# Patient Record
Sex: FEMALE | Race: WHITE | NOT HISPANIC OR LATINO | Employment: STUDENT | URBAN - METROPOLITAN AREA
[De-identification: names, ages, dates, MRNs, and addresses within clinical notes are randomized per-mention and may not be internally consistent; named-entity substitution may affect disease eponyms.]

---

## 2017-02-05 ENCOUNTER — TRANSFERRED RECORDS (OUTPATIENT)
Dept: HEALTH INFORMATION MANAGEMENT | Facility: CLINIC | Age: 27
End: 2017-02-05

## 2017-03-17 ENCOUNTER — PRENATAL OFFICE VISIT (OUTPATIENT)
Dept: NURSING | Facility: CLINIC | Age: 27
End: 2017-03-17
Payer: COMMERCIAL

## 2017-03-17 VITALS
RESPIRATION RATE: 16 BRPM | BODY MASS INDEX: 28.34 KG/M2 | DIASTOLIC BLOOD PRESSURE: 56 MMHG | SYSTOLIC BLOOD PRESSURE: 112 MMHG | WEIGHT: 166 LBS | HEIGHT: 64 IN | TEMPERATURE: 98.2 F | HEART RATE: 72 BPM

## 2017-03-17 DIAGNOSIS — O23.41 INFECTION OF URINARY TRACT IN PREGNANCY IN FIRST TRIMESTER: ICD-10-CM

## 2017-03-17 DIAGNOSIS — N91.2 ABSENCE OF MENSTRUATION: Primary | ICD-10-CM

## 2017-03-17 DIAGNOSIS — O09.90 HIGH-RISK PREGNANCY: ICD-10-CM

## 2017-03-17 LAB
ALBUMIN UR-MCNC: NEGATIVE MG/DL
APPEARANCE UR: ABNORMAL
BETA HCG QUAL IFA URINE: POSITIVE
BILIRUB UR QL STRIP: NEGATIVE
COLOR UR AUTO: YELLOW
ERYTHROCYTE [DISTWIDTH] IN BLOOD BY AUTOMATED COUNT: 15.7 % (ref 10–15)
GLUCOSE UR STRIP-MCNC: NEGATIVE MG/DL
HCT VFR BLD AUTO: 32 % (ref 35–47)
HGB BLD-MCNC: 10.2 G/DL (ref 11.7–15.7)
HGB UR QL STRIP: NEGATIVE
KETONES UR STRIP-MCNC: NEGATIVE MG/DL
LEUKOCYTE ESTERASE UR QL STRIP: NEGATIVE
MCH RBC QN AUTO: 23.8 PG (ref 26.5–33)
MCHC RBC AUTO-ENTMCNC: 31.9 G/DL (ref 31.5–36.5)
MCV RBC AUTO: 75 FL (ref 78–100)
NITRATE UR QL: POSITIVE
PH UR STRIP: 6 PH (ref 5–7)
PLATELET # BLD AUTO: 260 10E9/L (ref 150–450)
RBC # BLD AUTO: 4.29 10E12/L (ref 3.8–5.2)
SP GR UR STRIP: >1.03 (ref 1–1.03)
URN SPEC COLLECT METH UR: ABNORMAL
UROBILINOGEN UR STRIP-ACNC: 0.2 EU/DL (ref 0.2–1)
WBC # BLD AUTO: 6.2 10E9/L (ref 4–11)

## 2017-03-17 PROCEDURE — 86901 BLOOD TYPING SEROLOGIC RH(D): CPT | Performed by: FAMILY MEDICINE

## 2017-03-17 PROCEDURE — 87186 SC STD MICRODIL/AGAR DIL: CPT | Performed by: FAMILY MEDICINE

## 2017-03-17 PROCEDURE — 84703 CHORIONIC GONADOTROPIN ASSAY: CPT | Performed by: FAMILY MEDICINE

## 2017-03-17 PROCEDURE — 86850 RBC ANTIBODY SCREEN: CPT | Performed by: FAMILY MEDICINE

## 2017-03-17 PROCEDURE — 86900 BLOOD TYPING SEROLOGIC ABO: CPT | Performed by: FAMILY MEDICINE

## 2017-03-17 PROCEDURE — 87340 HEPATITIS B SURFACE AG IA: CPT | Performed by: FAMILY MEDICINE

## 2017-03-17 PROCEDURE — 86762 RUBELLA ANTIBODY: CPT | Performed by: FAMILY MEDICINE

## 2017-03-17 PROCEDURE — 81003 URINALYSIS AUTO W/O SCOPE: CPT | Performed by: FAMILY MEDICINE

## 2017-03-17 PROCEDURE — 99207 ZZC NO CHARGE NURSE ONLY: CPT

## 2017-03-17 PROCEDURE — 87086 URINE CULTURE/COLONY COUNT: CPT | Performed by: FAMILY MEDICINE

## 2017-03-17 PROCEDURE — 36415 COLL VENOUS BLD VENIPUNCTURE: CPT | Performed by: FAMILY MEDICINE

## 2017-03-17 PROCEDURE — 87088 URINE BACTERIA CULTURE: CPT | Performed by: FAMILY MEDICINE

## 2017-03-17 PROCEDURE — 85027 COMPLETE CBC AUTOMATED: CPT | Performed by: FAMILY MEDICINE

## 2017-03-17 PROCEDURE — 86780 TREPONEMA PALLIDUM: CPT | Performed by: FAMILY MEDICINE

## 2017-03-17 PROCEDURE — 87389 HIV-1 AG W/HIV-1&-2 AB AG IA: CPT | Performed by: FAMILY MEDICINE

## 2017-03-17 NOTE — MR AVS SNAPSHOT
"              After Visit Summary   3/17/2017    Nickie Sherman    MRN: [id number]           Patient Information     Date Of Birth          1990        Visit Information        Provider Department      3/17/2017 2:30 PM CP RN [facility]        Today's Diagnoses     Absence of menstruation    -  1    High-risk pregnancy          Care Instructions    Call the [facility] [place] to schedule early US to be done in about a week or more: [phone].        Follow-ups after your visit        Your next 10 appointments already scheduled     Apr 26, 2017  3:30 PM CDT   New Prenatal with Coby Chavez MD   [facility]    [address] [place] [id number] [phone]              Who to contact     If you have questions or need follow up information about today's clinic visit or your schedule please contact [facility] directly at [phone].  Normal or non-critical lab and imaging results will be communicated to you by iSiteshart, letter or phone within 4 business days after the clinic has received the results. If you do not hear from us within 7 days, please contact the clinic through iSiteshart or phone. If you have a critical or abnormal lab result, we will notify you by phone as soon as possible.  Submit refill requests through Cinexio or call your pharmacy and they will forward the refill request to us. Please allow 3 business days for your refill to be completed.          Additional Information About Your Visit        iSiteshart Information     Cinexio lets you send messages to your doctor, view your test results, renew your prescriptions, schedule appointments and more. To sign up, go to [email or web address] . Click on \"Log in\" on the left side of the screen, which will take you to the Welcome page. Then click on \"Sign up Now\" on the right side of the page.     You will be asked to " "enter the access code listed below, as well as some personal information. Please follow the directions to create your username and password.     Your access code is: [email or web address]  Expires: 6/15/2017  3:30 PM     Your access code will  in 90 days. If you need help or a new code, please call your [facility] or [phone].        Care EveryWhere ID     This is your Care EveryWhere ID. This could be used by other organizations to access your [place] medical records  [id number]        Your Vitals Were     Pulse Temperature Respirations Height Last Period Breastfeeding?    72 98.2  F (36.8  C) (Oral) 16 5' 4\" (1.626 m) 2017 (Approximate) Unknown    BMI (Body Mass Index)                   28.49 kg/m2            Blood Pressure from Last 3 Encounters:   17 112/56   16 92/57   11/27/15 96/63    Weight from Last 3 Encounters:   17 166 lb (75.3 kg)   16 162 lb (73.5 kg)   11/27/15 167 lb (75.8 kg)              We Performed the Following     ABO/RH TYPE AND SCREEN     Anti Treponema     Beta HCG qual IFA urine     CBC WITH PLATELETS     Hepatitis B surface antigen     HIV Antigen Antibody Combo     Rubella Antibody IgG Quantitative     Urine Culture Aerobic Bacterial     URINE MACROSCOPIC ONLY        Primary Care Provider Office Phone # Fax #    Ben Aponte [phone]       [facility] [address] [facility] [address]        Thank you!     Thank you for choosing [facility]  for your care. Our goal is always to provide you with excellent care. Hearing back from our patients is one way we can continue to improve our services. Please take a few minutes to complete the written survey that you may receive in the mail after your visit with us. Thank you!             Your Updated Medication List - Protect others around you: Learn how to safely use, store and throw away your medicines at www.disposemymeds.org.    "   Notice  As of 3/17/2017  3:41 PM    You have not been prescribed any medications.

## 2017-03-17 NOTE — PROGRESS NOTES
"  Estimated Date of Delivery: Nov 12, 2017    She has had bleeding since her LMP.  The bleeding  was pinkish, in small, on several occasions, and was not accompanied by cramping, she just noticed pink on toilet paper when wiping.   She has had mild nausea.. Weigh loss has not occurred.   This was not a planned pregnancy.   OTHER CONCERNS: none  STI HISTORY : none    Pre Term Labor Risk Assessment   1. Is the patient's age <18 or >40? No  2. Does the patient have a BMI < 18.5? No  3. If previous pregnancy, was delivery within previous 6 months? No  4. Have you ever been diagnosed with pyelonephritis? Yes: 2014 in , not admitted, just treated in ER  5. Have you ever delivered a baby prior to 37 weeks gestation? No  6. Have you ever been told you have a uterine anomaly? No  7. Do you currently have uterine fibroids? No  8. Have you had any gynecological surgical procedures such as cervical conization, a LEEP procedure, laser treatment or cryosurgery of the cervix? No  9. Did your mother take KAROL or any other hormones when she was pregnant with you? No  10. Did conception for this pregnancy occur via In Vitro Fertilization? No  11. Are you carrying twins? No  12. Do you currently use tobacco products? No, quit 8 months ago  13. Prior to this pregnancy, how much alcohol did you drink each week? (if >7/week= high risk..) \"little to none\"  14. Since you learned you were pregnant, how much beer, wine or hard liquor did you drink per week? (anything >0 = high risk) 0  15. Prior to learning you are pregnant, did you use any of the following: marijuana, prescription narcotics, speed, cocaine, heroin, hallucinogens or other drugs? (any use within 1 yr = high risk) No  16. Since you learned you are pregnant, have you used any of the following: marijuana, prescription narcotics, speed, cocaine, heroin, hallucinogens or other drugs? (any use = high risk) No  17. Do you have a history of chemical dependency (yes=high risk) " No  18. Have you ever been treated for more than 1 Urinary Tract Infection during this pregnancy? No  19. Do you have a history of Depression, Bi-polar disorder, anxiety, schizophrenia or other mental illness? Yes: anxiety, was on meds in , no meds, just counseling since   20. Are you currently being treated for depression, bi-polar disorder, anxiety, schizophrenia or other mental illness? No  21. Have you had Chlamydia or gonorrhea during this pregnancy? No  22. Periodontal disease (gum disease)? No  23. Has anyone hit, slapped, kicked or otherwise hurt you? No  24. Has anyone forced you to have sex when you didn't want to? No    Summary:  Patient is high risk for  Labor (verify Problem List includes V23.9 and note risk factor(s) in the Comments)  The patient has the following risk factors for pre term labor:  Q19: History of Depression, bi-polar, anxiety, schizophrenia       Prenatal OB Questionnaire  Past Medical History  Hypertension  No  Heart Disease, mitral valve prolapse, or rheumatic fever?  No  An autoimmune disorder such as Lupus or Rheumatoid Arthritis?  No  Kidney Disease or Urinary Tract Infection?  Yes: frequent UTI's during time in , only one since discharge  Epilepsy, seizures or spells?  No  Migraine headaches?  Yes: headaches improved since quit birth control patch early February  A stroke or loss of function or sensation?  No  Any other neurological problems?  No  Have you ever hepatitis, liver disease or jaundice?  No  Have you ever been treated for blood clots in your veins, deep venous thrombosis, inflammation in the veins, thrombosis, phlebitis, pulmonary embolism or varicosities?  No  Have you had excessive bleeding after surgery or dental work or have you been treated for very heavy periods?  Yes: heavy period/retained tampon at  visit early Feb  Do you have a history of anemia?  No  Have you ever been treated for Diabetes?  No  Have you ever been treated for   thyroid problems or taken thyroid medication?  No  Have you ever been in a major accident or suffered serious trauma?  Yes: head injury with loss of consciousness and requiring stitches in  in 2012  Have you ever had a blood transfusion?  No  Would you refuse a blood transfusion if a doctor judged it to be medically necessary?  No  If you answered yes, would you rather die than have a blood transfusion?  N/A  If you answered yes, is this for Congregation reasons?  N/A  Does anyone in your home smoke?  No  Is your blood type Rh negative?  No  Have you ever had abnormal antibodies in your blood?  No  Have you ever had asthma?  No  Have you ever had tuberculosis?  No  Do you have any allergies to drugs or over-the-counter medications?  No   Other allergies?  No  Do you have any breast problems?  No  Have you ever ?  Yes: only about 3 weeks with first baby    Have you had any other surgical procedures?  No  Have you been hospitalized for a nonsurgical reason excluding normal delivery?  No  Have you ever had any anesthetic complications?  No  Have you ever had an abnormal pap smear?  No  Do you have a history of abnormalities of the uterus?  No  Did it take you more than one year to become pregnant?  No  Have you ever been evaluated or treated for infertility?  No  Is there a history of medical problems in your family, which you feel might adversely affect your health or pregnancy?  No  Do you have any other problems we have not asked you about which you feel may be important to this pregnancy?  No  Do you have any of the following:    *abdominal pain  Yes: mild cramping when she has headache, resolves with hydrating and resting  *blood in stool or urine  No  *chest pain  No  *shortness of breath  No  *coughing or vomiting up blood  No  *heart racing or skipping beats  No  *nausea and vomiting  Yes: mild nausea in the evening, no vomiting  *pain with urination  No  *vaginal discharge or bleeding  Yes: some  whitish drainage, no odor or itching, occasional pink tinge on toilet paper  Current medications are:  Current Outpatient Prescriptions   Medication Sig Dispense Refill     Acetaminophen (TYLENOL PO)          Genetic Screening  At the time of birth, will you be 35 years old or older?  No  Has the patient, baby s father, or anyone in either family had:  Thalassemia (Italian, Greek, Mediterranean, or  background only) and an MCV result less than 80?  No  Neural tube defect such as meningomyelocele, spina bifida or anencephaly?  No  Congenital heart defect?  No  Down s syndrome?  No  Med-Sach s disease (Restorationist, Cajun, Lebanese-Bureau)?  No  Sickle cell disease or trait (Cesilia)?  No  Muscular dystrophy?  No  Cystic Fibrosis?  No  Sky s chorea?  No  Mental retardation/autism?  Yes: father of the baby has sister with developmental delay   If yes, was the person tested for fragile X?  No  Any other inherited genetic or chromosomal disorder?  Yes: father of the baby has Factor 5 deficiency  Maternal metabolic disorder (e.g. insulin-dependent diabetes, PKU)?  No  A child with birth defects not listed above?  No  Recurrent pregnancy loss or a stillbirth?  No  Does the patient or baby s father have any other genetic risks?  No  Infection History  Do we have your permission to complete routine prenatal lab tests.  This includes Hepatitis B, HIV? Yes  Do you feel that you are at high risk for coming in contact with the AIDS virus?  No  Have you ever been treated for tuberculosis?  No  Have you ever received the BCG vaccine for tuberculosis?  No  Do you live with someone who has tuberculosis?  No  Have you ever been exposed to tuberculosis?  No  Do you have genital herpes?  No  Does your partner have genital herpes?  No  Have you had a rash or viral illness since your last period?  No  Have you ever had Gonorrhea, Chlamydia, Syphilis, venereal warts, trichomoniasis, pelvic inflammatory disease or any other sexually  transmitted disease?  No  Have you had chicken pox?  Yes: as a child  Have you been vaccinated against chicken pox?  Yes  Have you had any other infectious disease?  No    Early ultrasound screening tool:    Does patient have irregular periods?  Yes: cycle lasts anywhere from 5-16 days  Did patient use hormonal birth control in the three months prior to positive urine pregnancy test?  Yes: patch stopped early Feb 2017 due to headaches  Is the patient breastfeeding?  No  Is the patient 10 weeks or greater at time of education visit?  No    Early dating US ordered, patient provided with phone number to call to schedule.    Chart routed to OB provider to review.  Daiana Hancock, KEYSHA  [facility]

## 2017-03-17 NOTE — Clinical Note
Nickie Masterson and father of the baby saw me for OB intake, irreg periods and stopped patch a little over a month ago so early dating US ordered.   Patient to call to schedule. High risk due to history of anxiety issues. Labs pending. Thanks! Pooja Hancock, RN [facility]

## 2017-03-17 NOTE — NURSING NOTE
Patient presents to clinic today with father of the baby for prenatal education.  This is patient's 2nd pregnancy.        Discussed pt's responses to Prenatal Questionnaire. Reviewed and discussed OB 1st Trimester Plans/Education  and also summarized in detail handouts and brochures included in OB Prenatal Folder that patient is able to keep and bring home with her.    Discussed all of the blood tests with pt who agrees to have all including HIV. Pt aware that results will be discussed at next office visit with MD unless abnl results are indicated and phone call will be made.    Will forward chart on to Prenatal Care Provider to review.    Daiana Hancock RN  [facility]

## 2017-03-17 NOTE — PATIENT INSTRUCTIONS
Call the [facility] at [place] to schedule early US to be done in about a week or more: [phone].

## 2017-03-17 NOTE — LETTER
[address] [id number] [phone]      March 22, 2017      Nickie Sherman  [address] [place] [id number]              Dear Nickie,    We have been trying to reach you regarding your lab results. You have a UTI and an antibiotic was sent to the [facility] pharmacy in [employer] in [place]. Please  prescription. If you have any questions, please call the clinic [phone].      Sincerely,    Coby Chavez MD/kennedi

## 2017-03-17 NOTE — LETTER
[address] [id number] [phone]        March 20, 2017      Nickie Sherman  [address] [place] [id number]          Dear Ms. Sherman,    The results of your recent lab tests were within normal limits. Enclosed is a copy of these results.  If you have any further questions or problems, please contact our office at [phone].  Component      Latest Ref Rng & Units 3/17/2017   WBC      4.0 - 11.0 10e9/L 6.2   RBC Count      3.8 - 5.2 10e12/L 4.29   Hemoglobin      11.7 - 15.7 g/dL 10.2 (L)   Hematocrit      35.0 - 47.0 % 32.0 (L)   MCV      78 - 100 fl 75 (L)   MCH      26.5 - 33.0 pg 23.8 (L)   MCHC      31.5 - 36.5 g/dL 31.9   RDW      10.0 - 15.0 % 15.7 (H)   Platelet Count      150 - 450 10e9/L 260   ABO       O   RH(D)       Pos   Antibody Screen       Neg   Test Valid Only At       [facility]   Specimen Expires       03/20/2017   HIV Antigen Antibody Combo      NR Nonreactive . . .   Rubella Antibody IgG Quantitative      IU/mL 20   Hep B Surface Agn      NR Nonreactive   Treponema pallidum Antibody      NEG Negative     Sincerely,        Coby Chavez MD/alfredo

## 2017-03-19 LAB
BACTERIA SPEC CULT: ABNORMAL
MICRO REPORT STATUS: ABNORMAL
MICROORGANISM SPEC CULT: ABNORMAL
SPECIMEN SOURCE: ABNORMAL

## 2017-03-20 ENCOUNTER — TELEPHONE (OUTPATIENT)
Dept: FAMILY MEDICINE | Facility: CLINIC | Age: 27
End: 2017-03-20

## 2017-03-20 DIAGNOSIS — N39.0 URINARY TRACT INFECTION WITHOUT HEMATURIA, SITE UNSPECIFIED: Primary | ICD-10-CM

## 2017-03-20 PROBLEM — O23.41: Status: ACTIVE | Noted: 2017-03-20

## 2017-03-20 LAB
ABO + RH BLD: NORMAL
ABO + RH BLD: NORMAL
BLD GP AB SCN SERPL QL: NORMAL
BLOOD BANK CMNT PATIENT-IMP: NORMAL
HBV SURFACE AG SERPL QL IA: NONREACTIVE
HIV 1+2 AB+HIV1 P24 AG SERPL QL IA: NORMAL
RUBV IGG SERPL IA-ACNC: 20 IU/ML
SPECIMEN EXP DATE BLD: NORMAL
T PALLIDUM IGG+IGM SER QL: NEGATIVE

## 2017-03-20 RX ORDER — CEPHALEXIN 500 MG/1
500 CAPSULE ORAL 3 TIMES DAILY
Qty: 21 CAPSULE | Refills: 0 | Status: SHIPPED | OUTPATIENT
Start: 2017-03-20 | End: 2017-03-27

## 2017-03-21 RX ORDER — AMOXICILLIN 500 MG/1
500 CAPSULE ORAL 3 TIMES DAILY
Qty: 21 CAPSULE | Refills: 0 | Status: SHIPPED | OUTPATIENT
Start: 2017-03-21 | End: 2017-03-28

## 2017-03-21 NOTE — PROGRESS NOTES
See phone encounter.  I called and discussed in detail with patient.  Treated for bladder infection.  Ben Aponte MD

## 2017-03-21 NOTE — TELEPHONE ENCOUNTER
I called patient to see if she got results yet on labs.  She had seen nurse for prenatal 1st ob and results came to me.  Patient not available.    Ben Aponte MD

## 2017-03-21 NOTE — TELEPHONE ENCOUNTER
Patient is returning the call from Dr Aponte yesterday.    Patient has not received her lab results yet.    Please call patient at [phone] .    Alana Harp

## 2017-03-28 ENCOUNTER — RADIANT APPOINTMENT (OUTPATIENT)
Dept: ULTRASOUND IMAGING | Facility: CLINIC | Age: 27
End: 2017-03-28
Attending: OBSTETRICS & GYNECOLOGY
Payer: COMMERCIAL

## 2017-03-28 DIAGNOSIS — O09.90 HIGH-RISK PREGNANCY: ICD-10-CM

## 2017-03-28 PROCEDURE — 76815 OB US LIMITED FETUS(S): CPT | Performed by: OBSTETRICS & GYNECOLOGY

## 2017-05-16 ENCOUNTER — TRANSFERRED RECORDS (OUTPATIENT)
Dept: HEALTH INFORMATION MANAGEMENT | Facility: CLINIC | Age: 27
End: 2017-05-16

## 2017-07-08 ENCOUNTER — HEALTH MAINTENANCE LETTER (OUTPATIENT)
Age: 27
End: 2017-07-08

## 2018-04-18 ENCOUNTER — OFFICE VISIT (OUTPATIENT)
Dept: FAMILY MEDICINE | Facility: CLINIC | Age: 28
End: 2018-04-18
Payer: COMMERCIAL

## 2018-04-18 VITALS
DIASTOLIC BLOOD PRESSURE: 68 MMHG | WEIGHT: 175 LBS | HEART RATE: 72 BPM | OXYGEN SATURATION: 98 % | HEIGHT: 64 IN | SYSTOLIC BLOOD PRESSURE: 100 MMHG | TEMPERATURE: 98.3 F | BODY MASS INDEX: 29.88 KG/M2

## 2018-04-18 DIAGNOSIS — Z30.011 ENCOUNTER FOR INITIAL PRESCRIPTION OF CONTRACEPTIVE PILLS: Primary | ICD-10-CM

## 2018-04-18 PROBLEM — O23.41: Status: RESOLVED | Noted: 2017-03-20 | Resolved: 2018-04-18

## 2018-04-18 PROCEDURE — 99213 OFFICE O/P EST LOW 20 MIN: CPT | Performed by: PHYSICIAN ASSISTANT

## 2018-04-18 RX ORDER — DESOGESTREL AND ETHINYL ESTRADIOL 0.15-0.03
1 KIT ORAL DAILY
Qty: 84 TABLET | Refills: 0 | Status: SHIPPED | OUTPATIENT
Start: 2018-04-18 | End: 2021-08-17

## 2018-04-18 NOTE — NURSING NOTE
"Chief Complaint   Patient presents with     Contraception     BC discussion        Initial /68  Pulse 72  Temp 98.3  F (36.8  C) (Oral)  Ht 5' 4.25\" (1.632 m)  Wt 175 lb (79.4 kg)  LMP 03/05/2018  SpO2 98%  BMI 29.81 kg/m2 Estimated body mass index is 29.81 kg/(m^2) as calculated from the following:    Height as of this encounter: 5' 4.25\" (1.632 m).    Weight as of this encounter: 175 lb (79.4 kg).  Medication Reconciliation: complete  Haven Mc MA    "

## 2018-04-18 NOTE — PROGRESS NOTES
"  SUBJECTIVE:   Nickie Sherman is a 28 year old female who presents to clinic today for the following health issues:        BC discussion   Hx of irregular periods and painful.  Ready for more permeant birth control.    No intercourse since last period.  No chance of pregnancy.        Problem list and histories reviewed & adjusted, as indicated.  Additional history: as documented    Patient Active Problem List   Diagnosis     CARDIOVASCULAR SCREENING; LDL GOAL LESS THAN 160     Supervision of high-risk pregnancy     Infection of urinary tract in pregnancy in first trimester     Past Surgical History:   Procedure Laterality Date     PE TUBES  98       Social History   Substance Use Topics     Smoking status: Former Smoker     Types: Cigarettes     Quit date: 6/26/2014     Smokeless tobacco: Never Used     Alcohol use Yes     Family History   Problem Relation Age of Onset     Hypertension Father      DIABETES Paternal Grandmother            Reviewed and updated as needed this visit by clinical staff  Tobacco  Allergies  Meds  Med Hx  Surg Hx  Fam Hx  Soc Hx      Reviewed and updated as needed this visit by Provider         ROS:  As above    OBJECTIVE:     /68  Pulse 72  Temp 98.3  F (36.8  C) (Oral)  Ht 5' 4.25\" (1.632 m)  Wt 175 lb (79.4 kg)  LMP 03/05/2018  SpO2 98%  BMI 29.81 kg/m2  Body mass index is 29.81 kg/(m^2).  GENERAL: healthy, alert and no distress    Diagnostic Test Results:  none     ASSESSMENT/PLAN:       1. Encounter for initial prescription of contraceptive pills  Use condoms for 2 weeks to prevent pregnancy.  Physical and IUD consult in the next 3 months   - desogestrel-ethinyl estradiol (APRI) 0.15-30 MG-MCG per tablet; Take 1 tablet by mouth daily  Dispense: 84 tablet; Refill: 0    FUTURE APPOINTMENTS:       - Follow-up for annual visit or as needed    Fatou Bridges PA-C  [facility]"

## 2018-04-18 NOTE — MR AVS SNAPSHOT
"              After Visit Summary   2018    Nickie Sherman    MRN: [id number]           Patient Information     Date Of Birth          1990        Visit Information        Provider Department      2018 9:40 AM Fatou Bridges PA-C [facility]        Today's Diagnoses     Encounter for initial prescription of contraceptive pills    -  1      Care Instructions    Schedule with Dr. Engelmann for physical and IUD discussion           Follow-ups after your visit        Who to contact     If you have questions or need follow up information about today's clinic visit or your schedule please contact [facility] directly at [phone].  Normal or non-critical lab and imaging results will be communicated to you by MyChart, letter or phone within 4 business days after the clinic has received the results. If you do not hear from us within 7 days, please contact the clinic through Video Furnacehart or phone. If you have a critical or abnormal lab result, we will notify you by phone as soon as possible.  Submit refill requests through Sviral or call your pharmacy and they will forward the refill request to us. Please allow 3 business days for your refill to be completed.          Additional Information About Your Visit        MyChart Information     Sviral lets you send messages to your doctor, view your test results, renew your prescriptions, schedule appointments and more. To sign up, go to [email or web address] . Click on \"Log in\" on the left side of the screen, which will take you to the Welcome page. Then click on \"Sign up Now\" on the right side of the page.     You will be asked to enter the access code listed below, as well as some personal information. Please follow the directions to create your username and password.     Your access code is: [email or web address]  Expires: 2018 10:31 AM     Your access code will  in 90 days. If you need help or a new code, " "please call your [place] clinic or [phone].        Care EveryWhere ID     This is your Care EveryWhere ID. This could be used by other organizations to access your [place] medical records  [id number]        Your Vitals Were     Pulse Temperature Height Last Period Pulse Oximetry BMI (Body Mass Index)    72 98.3  F (36.8  C) (Oral) 5' 4.25\" (1.632 m) 03/05/2018 98% 29.81 kg/m2       Blood Pressure from Last 3 Encounters:   04/18/18 100/68   03/17/17 112/56   11/18/16 92/57    Weight from Last 3 Encounters:   04/18/18 175 lb (79.4 kg)   03/17/17 166 lb (75.3 kg)   11/18/16 162 lb (73.5 kg)              Today, you had the following     No orders found for display         Today's Medication Changes          These changes are accurate as of 4/18/18 10:31 AM.  If you have any questions, ask your nurse or doctor.               Start taking these medicines.        Dose/Directions    desogestrel-ethinyl estradiol 0.15-30 MG-MCG per tablet   Commonly known as:  APRI   Used for:  Encounter for initial prescription of contraceptive pills   Started by:  Fatou Bridges PA-C        Dose:  1 tablet   Take 1 tablet by mouth daily   Quantity:  84 tablet   Refills:  0            Where to get your medicines      These medications were sent to [facility] [address]  [address] [facility] [address]     Phone:  [phone]     desogestrel-ethinyl estradiol 0.15-30 MG-MCG per tablet                Primary Care Provider Office Phone # Fax #    Ben Aponte [phone]       [address] [facility] [address]        Equal Access to Services     EMERITA OCHOA : Orquidea Burrell, chris nuno, qarob kaalmada hector, liana helms. So [facility] [phone].    ATENCIÓN: Si habla español, tiene a bach disposición servicios gratuitos de asistencia lingüística. Llame al " [phone].    We comply with applicable federal civil rights laws and [place] laws. We do not discriminate on the basis of race, color, national origin, age, disability, sex, sexual orientation, or gender identity.            Thank you!     Thank you for choosing [facility]  for your care. Our goal is always to provide you with excellent care. Hearing back from our patients is one way we can continue to improve our services. Please take a few minutes to complete the written survey that you may receive in the mail after your visit with us. Thank you!             Your Updated Medication List - Protect others around you: Learn how to safely use, store and throw away your medicines at www.disposemymeds.org.          This list is accurate as of 4/18/18 10:31 AM.  Always use your most recent med list.                   Brand Name Dispense Instructions for use Diagnosis    desogestrel-ethinyl estradiol 0.15-30 MG-MCG per tablet    APRI    84 tablet    Take 1 tablet by mouth daily    Encounter for initial prescription of contraceptive pills       TYLENOL PO

## 2021-08-17 ENCOUNTER — TELEPHONE (OUTPATIENT)
Dept: LAB | Facility: CLINIC | Age: 31
End: 2021-08-17

## 2021-08-17 ENCOUNTER — OFFICE VISIT (OUTPATIENT)
Dept: FAMILY MEDICINE | Facility: CLINIC | Age: 31
End: 2021-08-17
Payer: COMMERCIAL

## 2021-08-17 VITALS
DIASTOLIC BLOOD PRESSURE: 72 MMHG | HEART RATE: 78 BPM | OXYGEN SATURATION: 100 % | BODY MASS INDEX: 29.54 KG/M2 | TEMPERATURE: 98.1 F | HEIGHT: 64 IN | WEIGHT: 173.04 LBS | SYSTOLIC BLOOD PRESSURE: 113 MMHG

## 2021-08-17 DIAGNOSIS — Z11.3 SCREEN FOR STD (SEXUALLY TRANSMITTED DISEASE): ICD-10-CM

## 2021-08-17 DIAGNOSIS — F41.9 ANXIETY: ICD-10-CM

## 2021-08-17 DIAGNOSIS — Z01.818 TUBAL LIGATION EVALUATION: ICD-10-CM

## 2021-08-17 DIAGNOSIS — Z12.4 SCREENING FOR CERVICAL CANCER: ICD-10-CM

## 2021-08-17 DIAGNOSIS — F32.A DEPRESSION, UNSPECIFIED DEPRESSION TYPE: ICD-10-CM

## 2021-08-17 DIAGNOSIS — Z00.00 ROUTINE GENERAL MEDICAL EXAMINATION AT A HEALTH CARE FACILITY: Primary | ICD-10-CM

## 2021-08-17 DIAGNOSIS — Z30.011 ENCOUNTER FOR INITIAL PRESCRIPTION OF CONTRACEPTIVE PILLS: ICD-10-CM

## 2021-08-17 LAB
ERYTHROCYTE [DISTWIDTH] IN BLOOD BY AUTOMATED COUNT: 13.6 % (ref 10–15)
HCT VFR BLD AUTO: 35.4 % (ref 35–47)
HGB BLD-MCNC: 11.6 G/DL (ref 11.7–15.7)
MCH RBC QN AUTO: 28 PG (ref 26.5–33)
MCHC RBC AUTO-ENTMCNC: 32.8 G/DL (ref 31.5–36.5)
MCV RBC AUTO: 85 FL (ref 78–100)
PLATELET # BLD AUTO: 273 10E3/UL (ref 150–450)
RBC # BLD AUTO: 4.15 10E6/UL (ref 3.8–5.2)
WBC # BLD AUTO: 7 10E3/UL (ref 4–11)

## 2021-08-17 PROCEDURE — 99214 OFFICE O/P EST MOD 30 MIN: CPT | Mod: 25 | Performed by: PHYSICIAN ASSISTANT

## 2021-08-17 PROCEDURE — 86803 HEPATITIS C AB TEST: CPT | Performed by: PHYSICIAN ASSISTANT

## 2021-08-17 PROCEDURE — 87624 HPV HI-RISK TYP POOLED RSLT: CPT | Performed by: PHYSICIAN ASSISTANT

## 2021-08-17 PROCEDURE — 80061 LIPID PANEL: CPT | Performed by: PHYSICIAN ASSISTANT

## 2021-08-17 PROCEDURE — 85027 COMPLETE CBC AUTOMATED: CPT | Performed by: PHYSICIAN ASSISTANT

## 2021-08-17 PROCEDURE — 87591 N.GONORRHOEAE DNA AMP PROB: CPT | Performed by: PHYSICIAN ASSISTANT

## 2021-08-17 PROCEDURE — 80053 COMPREHEN METABOLIC PANEL: CPT | Performed by: PHYSICIAN ASSISTANT

## 2021-08-17 PROCEDURE — 87491 CHLMYD TRACH DNA AMP PROBE: CPT | Performed by: PHYSICIAN ASSISTANT

## 2021-08-17 PROCEDURE — G0145 SCR C/V CYTO,THINLAYER,RESCR: HCPCS | Performed by: PHYSICIAN ASSISTANT

## 2021-08-17 PROCEDURE — 36415 COLL VENOUS BLD VENIPUNCTURE: CPT | Performed by: PHYSICIAN ASSISTANT

## 2021-08-17 PROCEDURE — 87389 HIV-1 AG W/HIV-1&-2 AB AG IA: CPT | Performed by: PHYSICIAN ASSISTANT

## 2021-08-17 PROCEDURE — 86780 TREPONEMA PALLIDUM: CPT | Performed by: PHYSICIAN ASSISTANT

## 2021-08-17 PROCEDURE — 99385 PREV VISIT NEW AGE 18-39: CPT | Performed by: PHYSICIAN ASSISTANT

## 2021-08-17 RX ORDER — DESOGESTREL AND ETHINYL ESTRADIOL 0.15-0.03
1 KIT ORAL DAILY
Qty: 84 TABLET | Refills: 3 | Status: SHIPPED | OUTPATIENT
Start: 2021-08-17 | End: 2022-05-12

## 2021-08-17 RX ORDER — HYDROXYZINE HYDROCHLORIDE 10 MG/1
10 TABLET, FILM COATED ORAL EVERY 8 HOURS PRN
Qty: 30 TABLET | Refills: 0 | Status: SHIPPED | OUTPATIENT
Start: 2021-08-17 | End: 2021-11-05

## 2021-08-17 RX ORDER — MULTIPLE VITAMINS W/ MINERALS TAB 9MG-400MCG
1 TAB ORAL DAILY
COMMUNITY

## 2021-08-17 ASSESSMENT — PATIENT HEALTH QUESTIONNAIRE - PHQ9
5. POOR APPETITE OR OVEREATING: NEARLY EVERY DAY
SUM OF ALL RESPONSES TO PHQ QUESTIONS 1-9: 6

## 2021-08-17 ASSESSMENT — ENCOUNTER SYMPTOMS
HEARTBURN: 0
DIZZINESS: 0
PARESTHESIAS: 0
DIARRHEA: 0
MYALGIAS: 0
HEMATOCHEZIA: 0
ARTHRALGIAS: 0
CONSTIPATION: 0
CHILLS: 0
FREQUENCY: 0
FEVER: 0
DYSURIA: 0
ABDOMINAL PAIN: 0
SHORTNESS OF BREATH: 0
HEADACHES: 1
JOINT SWELLING: 0
HEMATURIA: 0
NAUSEA: 0
NERVOUS/ANXIOUS: 1
COUGH: 0
WEAKNESS: 0
SORE THROAT: 0
PALPITATIONS: 1
EYE PAIN: 0

## 2021-08-17 ASSESSMENT — ANXIETY QUESTIONNAIRES
1. FEELING NERVOUS, ANXIOUS, OR ON EDGE: MORE THAN HALF THE DAYS
7. FEELING AFRAID AS IF SOMETHING AWFUL MIGHT HAPPEN: MORE THAN HALF THE DAYS
2. NOT BEING ABLE TO STOP OR CONTROL WORRYING: SEVERAL DAYS
GAD7 TOTAL SCORE: 14
6. BECOMING EASILY ANNOYED OR IRRITABLE: NEARLY EVERY DAY
IF YOU CHECKED OFF ANY PROBLEMS ON THIS QUESTIONNAIRE, HOW DIFFICULT HAVE THESE PROBLEMS MADE IT FOR YOU TO DO YOUR WORK, TAKE CARE OF THINGS AT HOME, OR GET ALONG WITH OTHER PEOPLE: SOMEWHAT DIFFICULT
3. WORRYING TOO MUCH ABOUT DIFFERENT THINGS: MORE THAN HALF THE DAYS
5. BEING SO RESTLESS THAT IT IS HARD TO SIT STILL: SEVERAL DAYS

## 2021-08-17 ASSESSMENT — MIFFLIN-ST. JEOR: SCORE: 1484.89

## 2021-08-17 ASSESSMENT — PAIN SCALES - GENERAL: PAINLEVEL: NO PAIN (0)

## 2021-08-17 NOTE — PATIENT INSTRUCTIONS
Therapy - [facility] and [facility], [place], or check with your insurance.       Preventive Health Recommendations  Female Ages 26 - 39  Yearly exam:   See your health care provider every year in order to    Review health changes.     Discuss preventive care.      Review your medicines if you your doctor has prescribed any.    Until age 30: Get a Pap test every three years (more often if you have had an abnormal result).    After age 30: Talk to your doctor about whether you should have a Pap test every 3 years or have a Pap test with HPV screening every 5 years.   You do not need a Pap test if your uterus was removed (hysterectomy) and you have not had cancer.  You should be tested each year for STDs (sexually transmitted diseases), if you're at risk.   Talk to your provider about how often to have your cholesterol checked.  If you are at risk for diabetes, you should have a diabetes test (fasting glucose).  Shots: Get a flu shot each year. Get a tetanus shot every 10 years.   Nutrition:     Eat at least 5 servings of fruits and vegetables each day.    Eat whole-grain bread, whole-wheat pasta and brown rice instead of white grains and rice.    Get adequate Calcium and Vitamin D.     Lifestyle    Exercise at least 150 minutes a week (30 minutes a day, 5 days of the week). This will help you control your weight and prevent disease.    Limit alcohol to one drink per day.    No smoking.     Wear sunscreen to prevent skin cancer.    See your dentist every six months for an exam and cleaning.

## 2021-08-17 NOTE — PROGRESS NOTES
SUBJECTIVE:   CC: Nickie Sherman is an 31 year old woman who presents for preventive health visit.       Patient has been advised of split billing requirements and indicates understanding: Yes  Healthy Habits:     Getting at least 3 servings of Calcium per day:  Yes    Bi-annual eye exam:  Yes    Dental care twice a year:  Yes    Sleep apnea or symptoms of sleep apnea:  None    Diet:  Regular (no restrictions)    Frequency of exercise:  4-5 days/week    Duration of exercise:  30-45 minutes    Taking medications regularly:  Yes    Medication side effects:  Not applicable    PHQ-2 Total Score: 2    Additional concerns today:  Yes        Wants tubal ligation. Has 2 kids. No plans for more.     Having some panic attacks.   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 Was doing therapy. Used xanax. Feels a lot of stress.             Today's PHQ-2 Score:   PHQ-2 (  [employer]) 2021   Q1: Little interest or pleasure in doing things 1   Q2: Feeling down, depressed or hopeless 1   PHQ-2 Score 2   Q1: Little interest or pleasure in doing things Several days   Q2: Feeling down, depressed or hopeless Several days   PHQ-2 Score 2       Abuse: Current or Past (Physical, Sexual or Emotional) - No  Do you feel safe in your environment? Yes    Have you ever done Advance Care Planning? (For example, a Health Directive, POLST, or a discussion with a medical provider or your loved ones about your wishes): No, advance care planning information given to patient to review.  Patient declined advance care planning discussion at this time.    Social History     Tobacco Use     Smoking status: Former Smoker     Types: Cigarettes     Quit date: 2014     Years since quittin.1     Smokeless tobacco: Never Used   Substance Use Topics     Alcohol use: Yes         Alcohol Use 2021   Prescreen: >3 drinks/day or >7 drinks/week? No   Prescreen: >3 drinks/day or >7 drinks/week? -   No flowsheet data  found.    Reviewed orders with patient.  Reviewed health maintenance and updated orders accordingly - Yes  BP Readings from Last 3 Encounters:   08/17/21 113/72   04/18/18 100/68   03/17/17 112/56    Wt Readings from Last 3 Encounters:   08/17/21 78.5 kg (173 lb 0.6 oz)   04/18/18 79.4 kg (175 lb)   03/17/17 75.3 kg (166 lb)                    Breast Cancer Screening:    Breast CA Risk Assessment (FHS-7) 8/17/2021   Do you have a family history of breast, colon, or ovarian cancer? No / Unknown         Patient under 40 years of age: Routine Mammogram Screening not recommended.   Pertinent mammograms are reviewed under the imaging tab.    History of abnormal Pap smear: NO - age 30-65 PAP every 5 years with negative HPV co-testing recommended     Reviewed and updated as needed this visit by clinical staff  Tobacco  Allergies  Meds   Med Hx  Surg Hx  Fam Hx  Soc Hx        Reviewed and updated as needed this visit by Provider                    Review of Systems   Constitutional: Negative for chills and fever.   HENT: Positive for hearing loss. Negative for congestion, ear pain and sore throat.    Eyes: Negative for pain and visual disturbance.   Respiratory: Negative for cough and shortness of breath.    Cardiovascular: Positive for palpitations. Negative for chest pain and peripheral edema.   Gastrointestinal: Negative for abdominal pain, constipation, diarrhea, heartburn, hematochezia and nausea.   Genitourinary: Negative for dysuria, frequency, genital sores, hematuria and urgency.   Musculoskeletal: Negative for arthralgias, joint swelling and myalgias.   Skin: Negative for rash.   Neurological: Positive for headaches. Negative for dizziness, weakness and paresthesias.   Psychiatric/Behavioral: Positive for mood changes. The patient is nervous/anxious.           OBJECTIVE:   /72 (BP Location: Right arm, Patient Position: Chair, Cuff Size: Adult Regular)   Pulse 78   Temp 98.1  F (36.7  C) (Oral)   Ht  "1.626 m (5' 4\")   Wt 78.5 kg (173 lb 0.6 oz)   LMP 08/10/2021   SpO2 100%   BMI 29.70 kg/m    Physical Exam  GENERAL: healthy, alert and no distress  EYES: Eyes grossly normal to inspection, PERRL and conjunctivae and sclerae normal  HENT: ear canals and TM's normal, nose and mouth without ulcers or lesions  NECK: no adenopathy, no asymmetry, masses, or scars and thyroid normal to palpation  RESP: lungs clear to auscultation - no rales, rhonchi or wheezes  BREAST: normal without masses, tenderness or nipple discharge and no palpable axillary masses or adenopathy  CV: regular rate and rhythm, normal S1 S2, no S3 or S4, no murmur, click or rub, no peripheral edema and peripheral pulses strong  ABDOMEN: soft, nontender, no hepatosplenomegaly, no masses and bowel sounds normal   (female): normal female external genitalia, normal urethral meatus, vaginal mucosa pink, moist, well rugated, and normal cervix/adnexa/uterus without masses or discharge  MS: no gross musculoskeletal defects noted, no edema  SKIN: no suspicious lesions or rashes  NEURO: Normal strength and tone, mentation intact and speech normal  PSYCH: mentation appears normal, affect normal/bright    Diagnostic Test Results:  Labs reviewed in Epic    ASSESSMENT/PLAN:   1. Routine general medical examination at a health care facility  Well adult. No concerns.   - Lipid panel reflex to direct LDL Fasting; Future  - Comprehensive metabolic panel (BMP + Alb, Alk Phos, ALT, AST, Total. Bili, TP); Future  - CBC with platelets; Future  - CBC with platelets  - Comprehensive metabolic panel (BMP + Alb, Alk Phos, ALT, AST, Total. Bili, TP)  - Lipid panel reflex to direct LDL Fasting    2. Anxiety  3. Depression, unspecified depression type  Worsening anxiety. Order placed for mental health, start prozac and hydroxyzine. Follow up in 6 weeks.   - FLUoxetine (PROZAC) 20 MG capsule; Take 1 capsule (20 mg) by mouth daily  Dispense: 90 capsule; Refill: 0  - " "hydrOXYzine (ATARAX) 10 MG tablet; Take 1 tablet (10 mg) by mouth every 8 hours as needed for anxiety  Dispense: 30 tablet; Refill: 0    4. Tubal ligation evaluation  Patient requesting tubal ligation for birth control. Suggested she discuss further with ob/gyn.   - Ob/Gyn Referral; Future    5. Screen for STD (sexually transmitted disease)  Screen.  - HIV Antigen Antibody Combo [[id number]]; Future  - Hepatitis C antibody [[id number]]; Future  - Treponema Abs w Reflex to RPR and Titer [[id number]]; Future  - Chlamydia trachomatis PCR - Clinic Collect  - Neisseria gonorrhoeae PCR - Clinic Collect  - Treponema Abs w Reflex to RPR and Titer [[id number]]   - Hepatitis C antibody [[id number]]  - HIV Antigen Antibody Combo [[id number]]    6. Screening for cervical cancer  Screen.  - Pap thin layer screen with HPV - recommended age 30 - 65 years    Patient has been advised of split billing requirements and indicates understanding: Yes  COUNSELING:  Special attention given to:        Regular exercise       Healthy diet/nutrition       Contraception       Family planning       Consider Hep C screening for all patients one time for ages 18-79 years       HIV screeninx in teen years, 1x in adult years, and at intervals if high risk    Estimated body mass index is 29.7 kg/m  as calculated from the following:    Height as of this encounter: 1.626 m (5' 4\").    Weight as of this encounter: 78.5 kg (173 lb 0.6 oz).    Weight management plan: Discussed healthy diet and exercise guidelines    She reports that she quit smoking about 7 years ago. Her smoking use included cigarettes. She has never used smokeless tobacco.      Counseling Resources:  ATP IV Guidelines  Pooled Cohorts Equation Calculator  Breast Cancer Risk Calculator  BRCA-Related Cancer Risk Assessment: FHS-7 Tool  FRAX Risk Assessment  ICSI Preventive Guidelines  Dietary Guidelines for Americans, 2010  USDA's MyPlate  ASA Prophylaxis  Lung CA Screening    Noris Thrasher PA-C  [facility] " [facility] [place]

## 2021-08-17 NOTE — TELEPHONE ENCOUNTER
Patient came to the pharmacy wondering about her birth control medication. We didn't get one.please advise.  Thank you   Lynda Fernández. Pharmacy Technician   [facility]

## 2021-08-18 LAB
ALBUMIN SERPL-MCNC: 4.1 G/DL (ref 3.4–5)
ALP SERPL-CCNC: 84 U/L (ref 40–150)
ALT SERPL W P-5'-P-CCNC: 30 U/L (ref 0–50)
ANION GAP SERPL CALCULATED.3IONS-SCNC: 4 MMOL/L (ref 3–14)
AST SERPL W P-5'-P-CCNC: 25 U/L (ref 0–45)
BILIRUB SERPL-MCNC: 0.4 MG/DL (ref 0.2–1.3)
BUN SERPL-MCNC: 7 MG/DL (ref 7–30)
C TRACH DNA SPEC QL NAA+PROBE: NEGATIVE
CALCIUM SERPL-MCNC: 8.8 MG/DL (ref 8.5–10.1)
CHLORIDE BLD-SCNC: 107 MMOL/L (ref 94–109)
CHOLEST SERPL-MCNC: 142 MG/DL
CO2 SERPL-SCNC: 26 MMOL/L (ref 20–32)
CREAT SERPL-MCNC: 0.74 MG/DL (ref 0.52–1.04)
FASTING STATUS PATIENT QL REPORTED: ABNORMAL
GFR SERPL CREATININE-BSD FRML MDRD: >90 ML/MIN/1.73M2
GLUCOSE BLD-MCNC: 89 MG/DL (ref 70–99)
HCV AB SERPL QL IA: NONREACTIVE
HDLC SERPL-MCNC: 48 MG/DL
HIV 1+2 AB+HIV1 P24 AG SERPL QL IA: NONREACTIVE
LDLC SERPL CALC-MCNC: 82 MG/DL
N GONORRHOEA DNA SPEC QL NAA+PROBE: NEGATIVE
NONHDLC SERPL-MCNC: 94 MG/DL
POTASSIUM BLD-SCNC: 4 MMOL/L (ref 3.4–5.3)
PROT SERPL-MCNC: 7.7 G/DL (ref 6.8–8.8)
SODIUM SERPL-SCNC: 137 MMOL/L (ref 133–144)
T PALLIDUM AB SER QL: NONREACTIVE
TRIGL SERPL-MCNC: 61 MG/DL

## 2021-08-18 ASSESSMENT — ANXIETY QUESTIONNAIRES: GAD7 TOTAL SCORE: 14

## 2021-08-19 LAB
BKR LAB AP GYN ADEQUACY: NORMAL
BKR LAB AP GYN INTERPRETATION: NORMAL
BKR LAB AP HPV REFLEX: NORMAL
BKR LAB AP LMP: NORMAL
BKR LAB AP PREVIOUS ABNORMAL: NORMAL
PATH REPORT.COMMENTS IMP SPEC: NORMAL
PATH REPORT.RELEVANT HX SPEC: NORMAL

## 2021-08-20 LAB
HUMAN PAPILLOMA VIRUS 16 DNA: NEGATIVE
HUMAN PAPILLOMA VIRUS 18 DNA: NEGATIVE
HUMAN PAPILLOMA VIRUS FINAL DIAGNOSIS: NORMAL
HUMAN PAPILLOMA VIRUS OTHER HR: NEGATIVE

## 2021-08-24 ENCOUNTER — TELEPHONE (OUTPATIENT)
Dept: FAMILY MEDICINE | Facility: CLINIC | Age: 31
End: 2021-08-24

## 2021-08-24 NOTE — TELEPHONE ENCOUNTER
Biometric screening form has been faxed to [phone] and also mailed to the patient     Evelyn-

## 2021-09-05 ENCOUNTER — HEALTH MAINTENANCE LETTER (OUTPATIENT)
Age: 31
End: 2021-09-05

## 2021-11-05 ENCOUNTER — VIRTUAL VISIT (OUTPATIENT)
Dept: FAMILY MEDICINE | Facility: CLINIC | Age: 31
End: 2021-11-05
Payer: COMMERCIAL

## 2021-11-05 DIAGNOSIS — F41.9 ANXIETY: ICD-10-CM

## 2021-11-05 DIAGNOSIS — F32.A DEPRESSION, UNSPECIFIED DEPRESSION TYPE: ICD-10-CM

## 2021-11-05 PROCEDURE — 99213 OFFICE O/P EST LOW 20 MIN: CPT | Mod: GT | Performed by: NURSE PRACTITIONER

## 2021-11-05 RX ORDER — HYDROXYZINE HYDROCHLORIDE 10 MG/1
10 TABLET, FILM COATED ORAL EVERY 8 HOURS PRN
Qty: 90 TABLET | Refills: 3 | Status: SHIPPED | OUTPATIENT
Start: 2021-11-05 | End: 2022-05-05

## 2021-11-05 ASSESSMENT — ANXIETY QUESTIONNAIRES
GAD7 TOTAL SCORE: 10
6. BECOMING EASILY ANNOYED OR IRRITABLE: NOT AT ALL
5. BEING SO RESTLESS THAT IT IS HARD TO SIT STILL: MORE THAN HALF THE DAYS
3. WORRYING TOO MUCH ABOUT DIFFERENT THINGS: MORE THAN HALF THE DAYS
2. NOT BEING ABLE TO STOP OR CONTROL WORRYING: SEVERAL DAYS
7. FEELING AFRAID AS IF SOMETHING AWFUL MIGHT HAPPEN: SEVERAL DAYS
IF YOU CHECKED OFF ANY PROBLEMS ON THIS QUESTIONNAIRE, HOW DIFFICULT HAVE THESE PROBLEMS MADE IT FOR YOU TO DO YOUR WORK, TAKE CARE OF THINGS AT HOME, OR GET ALONG WITH OTHER PEOPLE: NOT DIFFICULT AT ALL
1. FEELING NERVOUS, ANXIOUS, OR ON EDGE: MORE THAN HALF THE DAYS

## 2021-11-05 ASSESSMENT — PAIN SCALES - GENERAL: PAINLEVEL: MILD PAIN (3)

## 2021-11-05 ASSESSMENT — PATIENT HEALTH QUESTIONNAIRE - PHQ9
SUM OF ALL RESPONSES TO PHQ QUESTIONS 1-9: 13
5. POOR APPETITE OR OVEREATING: MORE THAN HALF THE DAYS

## 2021-11-05 NOTE — PROGRESS NOTES
Nickie is a 31 year old who is being evaluated via a billable video visit.      How would you like to obtain your AVS? MyChart  If the video visit is dropped, the invitation should be resent by: Text to cell phone: [phone]  Will anyone else be joining your video visit? No    Video Start Time: 3:38 PM    Assessment & Plan     Anxiety  Symptoms improved with starting medication 2.5 months ago. The current medical regimen is effective;  continue present plan and medications.  - FLUoxetine (PROZAC) 20 MG capsule; Take 1 capsule (20 mg) by mouth daily  - hydrOXYzine (ATARAX) 10 MG tablet; Take 1 tablet (10 mg) by mouth every 8 hours as needed for anxiety    Depression, unspecified depression type  Symptoms improved with starting medication 2.5 months ago. She has her first therapy appointment scheduled next month. Counseled that regular exercise may also be helpful. Continue current treatment plan without change.   - FLUoxetine (PROZAC) 20 MG capsule; Take 1 capsule (20 mg) by mouth daily  - hydrOXYzine (ATARAX) 10 MG tablet; Take 1 tablet (10 mg) by mouth every 8 hours as needed for anxiety     Depression Screening Follow Up    PHQ 11/5/2021   PHQ-9 Total Score 13   Q9: Thoughts of better off dead/self-harm past 2 weeks Not at all     Return in about 6 months (around 5/5/2022) for depression and anxiety with Noris Thrasher .    ROSSY Gomez CNP  [facility] SUMAN Fu is a 31 year old who presents for the following health issues     HPI     Depression and Anxiety Follow-Up    How are you doing with your depression since your last visit? Improved     How are you doing with your anxiety since your last visit?  Improved     Are you having other symptoms that might be associated with depression or anxiety? Yes:  Lose train of thoughts more than usual     Have you had a significant life event? OTHER: recent move and a new job     Do you have any concerns with your use of alcohol  or other drugs? No  Started on fluoxetine about 2.5 months ago. States that she found the hydroxyzine which helps her anxiety attacks. No side effects from the medication.    States that she plans to start therapy in December (was booked out 3 months).     Social History     Tobacco Use     Smoking status: Former Smoker     Types: Cigarettes     Quit date: 2014     Years since quittin.3     Smokeless tobacco: Never Used   Substance Use Topics     Alcohol use: Yes     Drug use: No     PHQ 2021   PHQ-9 Total Score 6   Q9: Thoughts of better off dead/self-harm past 2 weeks Not at all     SAILAJA-7 SCORE 2021   Total Score 14     Last PHQ-9 2021   1.  Little interest or pleasure in doing things 0   2.  Feeling down, depressed, or hopeless 1   3.  Trouble falling or staying asleep, or sleeping too much 3   4.  Feeling tired or having little energy 3   5.  Poor appetite or overeating 3   6.  Feeling bad about yourself 0   7.  Trouble concentrating 1   8.  Moving slowly or restless 2   Q9: Thoughts of better off dead/self-harm past 2 weeks 0   PHQ-9 Total Score 13   Difficulty at work, home, or with people Not difficult at all     SAILAJA-7  2021   1. Feeling nervous, anxious, or on edge 2   2. Not being able to stop or control worrying 1   3. Worrying too much about different things 2   4. Trouble relaxing 2   5. Being so restless that it is hard to sit still 2   6. Becoming easily annoyed or irritable 0   7. Feeling afraid, as if something awful might happen 1   SAILAJA-7 Total Score 10   If you checked any problems, how difficult have they made it for you to do your work, take care of things at home, or get along with other people? Not difficult at all       Suicide Assessment Five-step Evaluation and Treatment (SAFE-T)    Review of Systems   Constitutional, HEENT, cardiovascular, pulmonary, gi and gu systems are negative, except as otherwise noted.      Objective    Vitals - Patient Reported  SpO2  (Patient Reported): 98  Temperature (Patient Reported): 100.4  F (38  C)  Pain Score: Mild Pain (3)    Physical Exam   GENERAL: Healthy, alert and no distress  EYES: Eyes grossly normal to inspection.  No discharge or erythema, or obvious scleral/conjunctival abnormalities.  RESP: No audible wheeze, cough, or visible cyanosis.  No visible retractions or increased work of breathing.    SKIN: Visible skin clear. No significant rash, abnormal pigmentation or lesions.  NEURO: Cranial nerves grossly intact.  Mentation and speech appropriate for age.  PSYCH: Mentation appears normal, affect normal/bright, judgement and insight intact, normal speech and appearance well-groomed.          Video-Visit Details    Type of service:  Video Visit    Video End Time:3:44 PM    Originating Location (pt. Location): Home    Distant Location (provider location):  [employer]     Platform used for Video Visit: [employer]

## 2021-11-06 ASSESSMENT — ANXIETY QUESTIONNAIRES: GAD7 TOTAL SCORE: 10

## 2022-05-04 DIAGNOSIS — F41.9 ANXIETY: ICD-10-CM

## 2022-05-04 DIAGNOSIS — F32.A DEPRESSION, UNSPECIFIED DEPRESSION TYPE: ICD-10-CM

## 2022-05-05 RX ORDER — HYDROXYZINE HYDROCHLORIDE 10 MG/1
10 TABLET, FILM COATED ORAL EVERY 8 HOURS PRN
Qty: 270 TABLET | Refills: 0 | Status: SHIPPED | OUTPATIENT
Start: 2022-05-05 | End: 2022-08-31

## 2022-05-05 NOTE — TELEPHONE ENCOUNTER
"Routing refill request to provider for review/approval because:  PHQ9 out of range.     PHQ 8/17/2021 11/5/2021 5/3/2022   PHQ-9 Total Score 6 13 17   Q9: Thoughts of better off dead/self-harm past 2 weeks Not at all Not at all Not at all       Requested Prescriptions   Pending Prescriptions Disp Refills    FLUoxetine (PROZAC) 20 MG capsule [Pharmacy Med Name: FLUOXETINE HCL 20 MG CAPSULE] 90 capsule 1     Sig: TAKE 1 CAPSULE BY MOUTH EVERY DAY        SSRIs Protocol Failed - 5/4/2022 12:32 AM        Failed - PHQ-9 score less than 5 in past 6 months     Please review last PHQ-9 score.           Passed - Medication is active on med list        Passed - Patient is age 18 or older        Passed - No active pregnancy on record        Passed - No positive pregnancy test in last 12 months        Passed - Recent (6 mo) or future (30 days) visit within the authorizing provider's specialty     Patient had office visit in the last 6 months or has a visit in the next 30 days with authorizing provider or within the authorizing provider's specialty.  See \"Patient Info\" tab in inbasket, or \"Choose Columns\" in Meds & Orders section of the refill encounter.              Signed Prescriptions Disp Refills    hydrOXYzine (ATARAX) 10 MG tablet 270 tablet 0     Sig: TAKE 1 TABLET (10 MG) BY MOUTH EVERY 8 HOURS AS NEEDED FOR ANXIETY        Antihistamines Protocol Passed - 5/4/2022 12:32 AM        Passed - Recent (12 mo) or future (30 days) visit within the authorizing provider's specialty     Patient has had an office visit with the authorizing provider or a provider within the authorizing providers department within the previous 12 mos or has a future within next 30 days. See \"Patient Info\" tab in inbasket, or \"Choose Columns\" in Meds & Orders section of the refill encounter.              Passed - Patient is age 3 or older     Apply age and/or weight-based dosing for peds patients age 3 and older.    Forward request to provider for " patients under the age of 3.            Passed - Medication is active on med list              Keri Sr RN   [facility]

## 2022-05-05 NOTE — TELEPHONE ENCOUNTER
Okay refill but advise clinic appointment with provider of choice ( patient lives in [place] ) in next couple months    Please inform patient    Ben Aponte MD

## 2022-05-05 NOTE — TELEPHONE ENCOUNTER
Atarax-Prescription approved per [facility] Refill Protocol. Patient has appointment with Paloma Alcocer, ALVIN scheduled for 5/12/22.     Keri Sr RN   [facility]

## 2022-05-12 ENCOUNTER — OFFICE VISIT (OUTPATIENT)
Dept: FAMILY MEDICINE | Facility: CLINIC | Age: 32
End: 2022-05-12
Payer: COMMERCIAL

## 2022-05-12 VITALS
HEIGHT: 65 IN | SYSTOLIC BLOOD PRESSURE: 96 MMHG | OXYGEN SATURATION: 100 % | WEIGHT: 176 LBS | DIASTOLIC BLOOD PRESSURE: 56 MMHG | TEMPERATURE: 98.8 F | HEART RATE: 83 BPM | BODY MASS INDEX: 29.32 KG/M2

## 2022-05-12 DIAGNOSIS — F41.9 ANXIETY: ICD-10-CM

## 2022-05-12 DIAGNOSIS — F32.A DEPRESSION, UNSPECIFIED DEPRESSION TYPE: ICD-10-CM

## 2022-05-12 DIAGNOSIS — Z30.09 ENCOUNTER FOR COUNSELING REGARDING CONTRACEPTION: Primary | ICD-10-CM

## 2022-05-12 PROCEDURE — 99214 OFFICE O/P EST MOD 30 MIN: CPT | Performed by: PHYSICIAN ASSISTANT

## 2022-05-12 RX ORDER — FLUOXETINE 40 MG/1
40 CAPSULE ORAL DAILY
Qty: 90 CAPSULE | Refills: 0 | Status: SHIPPED | OUTPATIENT
Start: 2022-05-12 | End: 2022-08-29

## 2022-05-12 ASSESSMENT — PATIENT HEALTH QUESTIONNAIRE - PHQ9
SUM OF ALL RESPONSES TO PHQ QUESTIONS 1-9: 19
10. IF YOU CHECKED OFF ANY PROBLEMS, HOW DIFFICULT HAVE THESE PROBLEMS MADE IT FOR YOU TO DO YOUR WORK, TAKE CARE OF THINGS AT HOME, OR GET ALONG WITH OTHER PEOPLE: VERY DIFFICULT
SUM OF ALL RESPONSES TO PHQ QUESTIONS 1-9: 19

## 2022-05-12 ASSESSMENT — ANXIETY QUESTIONNAIRES
1. FEELING NERVOUS, ANXIOUS, OR ON EDGE: NEARLY EVERY DAY
GAD7 TOTAL SCORE: 19
GAD7 TOTAL SCORE: 19
6. BECOMING EASILY ANNOYED OR IRRITABLE: MORE THAN HALF THE DAYS
7. FEELING AFRAID AS IF SOMETHING AWFUL MIGHT HAPPEN: MORE THAN HALF THE DAYS
2. NOT BEING ABLE TO STOP OR CONTROL WORRYING: NEARLY EVERY DAY
GAD7 TOTAL SCORE: 19
3. WORRYING TOO MUCH ABOUT DIFFERENT THINGS: NEARLY EVERY DAY
8. IF YOU CHECKED OFF ANY PROBLEMS, HOW DIFFICULT HAVE THESE MADE IT FOR YOU TO DO YOUR WORK, TAKE CARE OF THINGS AT HOME, OR GET ALONG WITH OTHER PEOPLE?: EXTREMELY DIFFICULT
7. FEELING AFRAID AS IF SOMETHING AWFUL MIGHT HAPPEN: MORE THAN HALF THE DAYS
4. TROUBLE RELAXING: NEARLY EVERY DAY
5. BEING SO RESTLESS THAT IT IS HARD TO SIT STILL: NEARLY EVERY DAY

## 2022-05-12 NOTE — PROGRESS NOTES
"  Assessment & Plan     1. Encounter for counseling regarding contraception    2. Anxiety    3. Depression, unspecified depression type      Referral for tubal ligation consult.   Anxiety and depression not well controlled. Increased prozac.   Continue with counselor.   Recommended patient consider support groups for Vets/particularly moms.              BMI:   Estimated body mass index is 29.68 kg/m  as calculated from the following:    Height as of this encounter: 1.64 m (5' 4.57\").    Weight as of this encounter: 79.8 kg (176 lb).       Depression Screening Follow Up    PHQ 5/12/2022   PHQ-9 Total Score 19   Q9: Thoughts of better off dead/self-harm past 2 weeks Not at all         Follow Up Actions Taken  Crisis resource information provided in After Visit Summary         No follow-ups on file.    Paloma Alcocer PA-C  [facility] SUMAN Fu is a 32 year old who presents for the following health issues Answers for HPI/ROS submitted by the patient on 5/12/2022  If you checked off any problems, how difficult have these problems made it for you to do your work, take care of things at home, or get along with other people?: Very difficult  PHQ9 TOTAL SCORE: 19  SAILAJA 7 TOTAL SCORE: 19        HPI     Abnormal Mood Symptoms  Onset/Duration: x6 months  Description:   Depression (if yes, do PHQ-9): YES  Anxiety (if yes, do SAILAJA-7): YES  Accompanying Signs & Symptoms:  Still participating in activities that you used to enjoy: YES  Fatigue: YES  Irritability: YES  Difficulty concentrating: YES  Changes in appetite: YES  Problems with sleep: YES  Heart racing/beating fast: no  Abnormally elevated, expansive, or irritable mood: YES  Persistently increased activity or energy: YES  Thoughts of hurting yourself or others: no  History:  Recent stress or major life event: YES- Family, Home, Jobs, Money  Prior depression or anxiety: None  Family history of depression or anxiety: YES  Alcohol/drug use: " "no  Difficulty sleeping: YES  Precipitating or alleviating factors: None  Therapies tried and outcome: individual therapy  PHQ 11/5/2021 5/3/2022 5/12/2022   PHQ-9 Total Score 13 17 19   Q9: Thoughts of better off dead/self-harm past 2 weeks Not at all Not at all Not at all     SAILAJA-7 SCORE 11/5/2021 5/3/2022 5/12/2022   Total Score - 18 (severe anxiety) 19 (severe anxiety)   Total Score 10 18 19     Therapist-SAILAJA and depression and maybe ADHD- not   Constant state of anxiety.   Not having anxiety attacks specifically hydroxyzine makes her tired.   Doesn't feel as depressed as she did when started the prozac.     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 Now can be hours until she falls asleep.   Struggling to keep a job. 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     Review of Systems   Constitutional, HEENT, cardiovascular, pulmonary, gi and gu systems are negative, except as otherwise noted.      Objective    BP 96/56 (BP Location: Right arm, Patient Position: Chair, Cuff Size: Adult Regular)   Pulse 83   Temp 98.8  F (37.1  C) (Oral)   Ht 1.64 m (5' 4.57\")   Wt 79.8 kg (176 lb)   LMP 04/12/2022   SpO2 100%   Breastfeeding No   BMI 29.68 kg/m    Body mass index is 29.68 kg/m .  Physical Exam   GENERAL: healthy, alert and no distress  PSYCH: mentation appears normal, affect tearful at times.                 "

## 2022-05-13 ASSESSMENT — ANXIETY QUESTIONNAIRES: GAD7 TOTAL SCORE: 19

## 2022-05-13 ASSESSMENT — PATIENT HEALTH QUESTIONNAIRE - PHQ9: SUM OF ALL RESPONSES TO PHQ QUESTIONS 1-9: 19

## 2022-08-26 DIAGNOSIS — F41.9 ANXIETY: ICD-10-CM

## 2022-08-26 DIAGNOSIS — F32.A DEPRESSION, UNSPECIFIED DEPRESSION TYPE: ICD-10-CM

## 2022-08-26 NOTE — LETTER
September 1, 2022    Nickie Sherman  [id number] [address] [id number]      Dear Nickie Sherman,     Your provider has sent a  norman refill of FLUoxetine (PROZAC) 40 MG capsule. You are due for an appointment for further refills.  Please contact the clinic to schedule an appointment for further refills. Please call our scheduling line at [phone] or you can schedule via [employer].         Your Health Care Team,    [employer]

## 2022-08-29 RX ORDER — FLUOXETINE 40 MG/1
CAPSULE ORAL
Qty: 90 CAPSULE | Refills: 0 | Status: SHIPPED | OUTPATIENT
Start: 2022-08-29 | End: 2022-08-31

## 2022-08-29 NOTE — TELEPHONE ENCOUNTER
Routing refill request to provider for review/approval because:  Failed Protocol    Nadege Clayton RN BSN  [facility]

## 2022-08-29 NOTE — TELEPHONE ENCOUNTER
Okay refill but advise follow up with an available provider in next couple months     Saw Leodan last time    Please inform patient    Ben Aponte MD

## 2022-08-30 ASSESSMENT — ANXIETY QUESTIONNAIRES
GAD7 TOTAL SCORE: 7
GAD7 TOTAL SCORE: 7
7. FEELING AFRAID AS IF SOMETHING AWFUL MIGHT HAPPEN: NOT AT ALL
IF YOU CHECKED OFF ANY PROBLEMS ON THIS QUESTIONNAIRE, HOW DIFFICULT HAVE THESE PROBLEMS MADE IT FOR YOU TO DO YOUR WORK, TAKE CARE OF THINGS AT HOME, OR GET ALONG WITH OTHER PEOPLE: VERY DIFFICULT
5. BEING SO RESTLESS THAT IT IS HARD TO SIT STILL: SEVERAL DAYS
6. BECOMING EASILY ANNOYED OR IRRITABLE: MORE THAN HALF THE DAYS
GAD7 TOTAL SCORE: 7
4. TROUBLE RELAXING: SEVERAL DAYS
3. WORRYING TOO MUCH ABOUT DIFFERENT THINGS: SEVERAL DAYS
1. FEELING NERVOUS, ANXIOUS, OR ON EDGE: SEVERAL DAYS
7. FEELING AFRAID AS IF SOMETHING AWFUL MIGHT HAPPEN: NOT AT ALL
2. NOT BEING ABLE TO STOP OR CONTROL WORRYING: SEVERAL DAYS
8. IF YOU CHECKED OFF ANY PROBLEMS, HOW DIFFICULT HAVE THESE MADE IT FOR YOU TO DO YOUR WORK, TAKE CARE OF THINGS AT HOME, OR GET ALONG WITH OTHER PEOPLE?: VERY DIFFICULT

## 2022-08-30 ASSESSMENT — PATIENT HEALTH QUESTIONNAIRE - PHQ9
10. IF YOU CHECKED OFF ANY PROBLEMS, HOW DIFFICULT HAVE THESE PROBLEMS MADE IT FOR YOU TO DO YOUR WORK, TAKE CARE OF THINGS AT HOME, OR GET ALONG WITH OTHER PEOPLE: SOMEWHAT DIFFICULT
SUM OF ALL RESPONSES TO PHQ QUESTIONS 1-9: 11
SUM OF ALL RESPONSES TO PHQ QUESTIONS 1-9: 11

## 2022-08-30 NOTE — TELEPHONE ENCOUNTER
Tried calling pt about message below and the vm box has not been setup yet.  Sending pt mychart message. Please help pt schedule an appointment in the next few months for any further refills.    Latha VELASCO MA

## 2022-08-31 ENCOUNTER — VIRTUAL VISIT (OUTPATIENT)
Dept: FAMILY MEDICINE | Facility: CLINIC | Age: 32
End: 2022-08-31
Payer: COMMERCIAL

## 2022-08-31 DIAGNOSIS — G47.00 INSOMNIA, UNSPECIFIED TYPE: ICD-10-CM

## 2022-08-31 DIAGNOSIS — F32.A DEPRESSION, UNSPECIFIED DEPRESSION TYPE: ICD-10-CM

## 2022-08-31 DIAGNOSIS — F41.9 ANXIETY: Primary | ICD-10-CM

## 2022-08-31 PROCEDURE — 99214 OFFICE O/P EST MOD 30 MIN: CPT | Mod: 95 | Performed by: NURSE PRACTITIONER

## 2022-08-31 RX ORDER — HYDROXYZINE HYDROCHLORIDE 25 MG/1
12.5-25 TABLET, FILM COATED ORAL
Qty: 90 TABLET | Refills: 3 | Status: SHIPPED | OUTPATIENT
Start: 2022-08-31 | End: 2022-11-28

## 2022-08-31 RX ORDER — FLUOXETINE 40 MG/1
40 CAPSULE ORAL DAILY
Qty: 90 CAPSULE | Refills: 2 | Status: SHIPPED | OUTPATIENT
Start: 2022-08-31 | End: 2023-09-27

## 2022-08-31 ASSESSMENT — ANXIETY QUESTIONNAIRES: GAD7 TOTAL SCORE: 7

## 2022-08-31 ASSESSMENT — PATIENT HEALTH QUESTIONNAIRE - PHQ9
SUM OF ALL RESPONSES TO PHQ QUESTIONS 1-9: 11
10. IF YOU CHECKED OFF ANY PROBLEMS, HOW DIFFICULT HAVE THESE PROBLEMS MADE IT FOR YOU TO DO YOUR WORK, TAKE CARE OF THINGS AT HOME, OR GET ALONG WITH OTHER PEOPLE: SOMEWHAT DIFFICULT

## 2022-08-31 NOTE — PATIENT INSTRUCTIONS
I would encourage routine wellness within 6 months it actually looks like your last physical was in August of last year.     Also 1 hepatitis B vaccine was not done or maybe you have documentation of this with the [employer] you could get us?          As of April 1, 2018, [facility] suicide prevention and mental health crisis texting services are now available 24 hours a day, seven days a week. People who text [place] to 648272 will be connected to Crisis Text Line. Crisis Text Line handles 50,000 messages per month and over 20 million messages since 2013 from across the [place] connecting people to local resources in their community. For callers who are the most in distress, average wait times for a response is only 39 seconds.        [facility] Mobile Crisis Response Services  (contact the county where the person is physically located at the time of the crisis)  *[place] (Child and Adult):    [phone]  *[place] (Child and Adult):    [phone]  *[place] (Child and Adult):    [phone]  *[place] (Child):    [phone]                           (Adult):    [phone]  *[place] (Child):    [phone]                 (Adult):    [phone]  *[place] (Child and Adult):    [phone]  *[place] (Child and Adult):    [phone]  Other Crisis Resources (non-mobile)  *[facility]):    [phone]                                Walk-in and telephone crisis intervention services (focuses on >18 year-olds)                         Located at [address] [address]            Crisis Resources   These hotlines are for both adults and children. They and are open 24 hours a day, 7 days a week unless noted otherwise.    National Suicide Prevention Lifeline   988 or 4-244-829-ZLNO (7898)    Crisis Text  Line    www.crisistextline.org  Text HOME to 623469 from anywhere in the [place], anytime, about any type of crisis. A live, trained crisis counselor will receive the text and respond quickly.    [place] Lifeline for LGBTQ Youth  A national crisis intervention and suicide lifeline for LGBTQ youth under 25. Provides a safe place to talk without judgement. Call 1-967.179.6445; text START to 144541 or visit www.thetrevorproject.org to talk to a trained counselor.                 Text Telephone:    1-692-054-8ZVR (6249)                 LGBT Youth Suicide Hotline:    3-915-6-U-LEIDA      *[facility] ( women and children):    [phone]  or  [phone]  *[facility] Crisis Line ( women and children):    [phone]                                                                                 Short-term Residential Crisis Resources  *[facility] (ages 10-17):  [phone]                                               [address] [place] [address]   *[facility] (Birth - 6 years):    [phone]  *[facility] (Birth - 12 years):    [phone]                                                                             Inpatient Hospitalization and Residential Evaluation  *[facility] (Child and Adult)                                               [address] [place] [address]                                Inpatient Intake [phone]                                [facility]:    [phone]                                Day Treatment/Behavioral Intake:    [phone]  *[facility] (Adult):    [phone]       Paloma Cardenas, ADALIDP-BC

## 2022-08-31 NOTE — PROGRESS NOTES
"Nickie is a 32 year old who is being evaluated via a billable video visit.      How would you like to obtain your AVS? MyChart  If the video visit is dropped, the invitation should be resent by: Text to cell phone: [phone]  Will anyone else be joining your video visit? No    Assessment & Plan     Anxiety  Depression, unspecified depression type  Feels under better control on 40 mg daily dosing.  Continue same medication this was refilled today.  Routine in person physical and follow-up on mood within 6 months.  - FLUoxetine (PROZAC) 40 MG capsule  Dispense: 90 capsule; Refill: 2    Insomnia, unspecified type  Not under good control on 10 mg dosing discussed with her using a higher dose of hydroxyzine if wanting it for sleep.  Okay to take a 10 mg during the day for any panic attack.  New prescription for 25 mg hydroxyzine at bedtime.  - hydrOXYzine (ATARAX) 25 MG tablet  Dispense: 90 tablet; Refill: 3    BMI:   Estimated body mass index is 29.68 kg/m  as calculated from the following:    Height as of 5/12/22: 1.64 m (5' 4.57\").    Weight as of 5/12/22: 79.8 kg (176 lb).     Depression Screening Follow Up    PHQ 8/30/2022   PHQ-9 Total Score 11   Q9: Thoughts of better off dead/self-harm past 2 weeks Not at all     Follow Up Actions Taken  Crisis resource information provided in After Visit Summary     Return in about 6 months (around 2/28/2023) for Wellness exam fasting labs.      ROS[facility]    Richard Fu is a 32 year old, presenting for the following health issues:  Recheck Medication      History of Present Illness       Mental Health Follow-up:  Patient presents to follow-up on Depression & Anxiety.Patient's depression since last visit has been:  No change  The patient is not having other symptoms associated with depression.  Patient's anxiety since last visit has been:  No change  The patient is not having other symptoms associated with anxiety.  Any " significant life events: financial concerns  Patient is feeling anxious or having panic attacks.  Patient has no concerns about alcohol or drug use.    She eats 2-3 servings of fruits and vegetables daily.She consumes 1 sweetened beverage(s) daily.She exercises with enough effort to increase her heart rate 30 to 60 minutes per day.  She exercises with enough effort to increase her heart rate 3 or less days per week.   She is taking medications regularly.    Today's PHQ-9         PHQ-9 Total Score: 11    PHQ-9 Q9 Thoughts of better off dead/self-harm past 2 weeks :   Not at all    How difficult have these problems made it for you to do your work, take care of things at home, or get along with other people: Somewhat difficult  Today's SAILAJA-7 Score: 7     Not staying sleep well.  Hydroxyzine not working for sleep at 10 mg dose.   Prozac dose increased from 20 mg to 40 mg noted improvement of symptoms side effects.      Review of Systems   Constitutional, HEENT, cardiovascular, pulmonary, GI, , musculoskeletal, neuro, skin, endocrine and psych systems are negative, except as otherwise noted in the HPI.      Objective           Vitals:  No vitals were obtained today due to virtual visit.    Physical Exam   GENERAL: Healthy, alert and no distress  EYES: Eyes grossly normal to inspection.  No discharge or erythema, or obvious scleral/conjunctival abnormalities.  RESP: No audible wheeze, cough, or visible cyanosis.  No visible retractions or increased work of breathing.    SKIN: Visible skin clear. No significant rash, abnormal pigmentation or lesions.  NEURO: Cranial nerves grossly intact.  Mentation and speech appropriate for age.  PSYCH: Mentation appears normal, affect normal/bright, judgement and insight intact, normal speech and appearance well-groomed.      Video-Visit Details    Video Start Time: 9:57 AM    Type of service:  Video Visit    Video End Time:10:03 AM    Originating Location (pt. Location):  Home    Distant Location (provider location):  [facility]     Platform used for Video Visit: Luly

## 2022-09-26 ENCOUNTER — OFFICE VISIT (OUTPATIENT)
Dept: FAMILY MEDICINE | Facility: CLINIC | Age: 32
End: 2022-09-26
Payer: COMMERCIAL

## 2022-09-26 VITALS
TEMPERATURE: 99 F | RESPIRATION RATE: 12 BRPM | SYSTOLIC BLOOD PRESSURE: 100 MMHG | HEART RATE: 98 BPM | BODY MASS INDEX: 30.02 KG/M2 | DIASTOLIC BLOOD PRESSURE: 60 MMHG | WEIGHT: 178 LBS | OXYGEN SATURATION: 98 %

## 2022-09-26 DIAGNOSIS — R51.9 CHRONIC NONINTRACTABLE HEADACHE, UNSPECIFIED HEADACHE TYPE: ICD-10-CM

## 2022-09-26 DIAGNOSIS — G47.00 INSOMNIA, UNSPECIFIED TYPE: Primary | ICD-10-CM

## 2022-09-26 DIAGNOSIS — G89.29 CHRONIC NONINTRACTABLE HEADACHE, UNSPECIFIED HEADACHE TYPE: ICD-10-CM

## 2022-09-26 PROCEDURE — 99214 OFFICE O/P EST MOD 30 MIN: CPT | Performed by: PHYSICIAN ASSISTANT

## 2022-09-26 NOTE — PROGRESS NOTES
Assessment & Plan     Insomnia, unspecified type  Atarax nightly and stable. See below.     Chronic nonintractable headache, unspecified headache type  Ongoing for years s/p TBI while serving in . Headaches waking from sleep. Given this and TBI history, imaging felt warranted. Also recommending consulting with neurology as well. Pending MRI, symptoms suggest migraines and imitrex prn with ongoing anxiety and depression management to be continued. If worsening symptoms, consider preventative management.   - Adult Neurology  Referral; Future  - MR Brain w/o & w Contrast; Future         No follow-ups on file.    Cain Bearden PA-C  [facility] BRENNA Fu is a 32 year old accompanied by her self, presenting for the following health issues:  Orders (Consult for a CT due to head injury in the  in 2011) and Headache (Gets migraines weekly )      History of Present Illness       Reason for visit:  [place] denied [place], appealing, need a CT scan to see if the head injury I got in June of 2011 is effecting me physically. I have had chronic head aches and migraines but never really sought treatment for it.    She eats 2-3 servings of fruits and vegetables daily.She consumes 1 sweetened beverage(s) daily.She exercises with enough effort to increase her heart rate 30 to 60 minutes per day.  She exercises with enough effort to increase her heart rate 3 or less days per week.   She is taking medications regularly.       Headache  Onset: since 2011    Description:   Location: Bilateral in the frontal area and then will either go behind her eyes or to the back of her head.  Character: sharp pain  Frequency:  Usually every other week, sometimes weekly with menses. She is having them at least twice a month but can be as much as 2 times a week.  Duration:  Not more than 3 hours normally, sometimes will last the whole day    Intensity: moderate,  severe    Progression of Symptoms:  Worsening, intermittent. Does note that since November 2021, when she was diagnosed with major anxiety/depressive disorder and PTSD, her headaches have been more noticeable.     Accompanying Signs & Symptoms:  Stiff neck: YES  Neck or upper back pain: YES  Fever: No  Sinus pressure: No  Nausea or vomiting: YES  Dizziness: YES  Numbness: YES- bilateral hands and elbows  Weakness: YES  Visual changes: YES- reports occasionally will feel like she is losing her peripheral vision    History:   Head trauma: YES- head injury while in the  in 2011  Family history of migraines: YES- mother  Previous tests for headaches: YES- [employer] had CT done, no records currently, requesting head CT be performed  Neurologist evaluations: no  Able to do daily activities: No-headache are fairly limiting when they occur  Wake with a headaches: YES- 50% of the time they will be there when she wakes up, other times she will get them starting later in the evenings around 7/8pm.   Do headaches wake you up: YES  Daily pain medication use: No daily medication use  Work/school stressors/changes: Has been unable to hold a job for more than 3-6 months due to mental health issues as well as the headaches.     Precipitating factors:   Does light make it worse: YES  Does sound make it worse: YES    Alleviating factors:  Does sleep help: No  Therapies Tried and outcome: Tylenol and Excedrin which are not fully effective but she feels she can function when she is taking these.     Using hydroxyzine 25 mg every night for insomnia.    Review of Systems   Constitutional, HEENT, cardiovascular, pulmonary, GI, , musculoskeletal, neuro, skin, endocrine and psych systems are negative, except as otherwise noted.      Objective    /60 (BP Location: Right arm, Patient Position: Chair, Cuff Size: Adult Regular)   Pulse 98   Temp 99  F (37.2  C) (Oral)   Resp 12   Wt 80.7 kg (178 lb)   SpO2 98%   BMI 30.02 kg/m     Body mass index is 30.02 kg/m .  Physical Exam   GENERAL: healthy, alert and no distress  NEURO: Normal strength and tone, sensory exam grossly normal, mentation intact, speech normal, cranial nerves 2-12 intact, Romberg normal and rapid alternating movements normal  PSYCH: mentation appears normal, affect normal/bright                Answers for HPI/ROS submitted by the patient on 9/25/2022  What is the reason for your visit today? : [place] denied [place], appealing, need a CT scan to see if the head injury I got in June of 2011 is effecting me physically. I have had chronic head aches and migraines but never really sought treatment for it.  How many servings of fruits and vegetables do you eat daily?: 2-3  On average, how many sweetened beverages do you drink each day (Examples: soda, juice, sweet tea, etc.  Do NOT count diet or artificially sweetened beverages)?: 1  How many minutes a day do you exercise enough to make your heart beat faster?: 30 to 60  How many days a week do you exercise enough to make your heart beat faster?: 3 or less  How many days per week do you miss taking your medication?: 0

## 2022-09-26 NOTE — PROGRESS NOTES
Assessment & Plan     Insomnia, unspecified type  Using hydroxyzine 25 mg at night for insomnia.     Chronic nonintractable headache, unspecified headache type  Chronic headaches since head injury in 2011. Had imaging at that time, imaging results not available. She never followed up after this head injury and has never been seen by a Neurologist. She states the headaches have worsened slightly but mostly she feels she is just noticing them more since being more aware of her physical and mental health. Discussed advanced brain imaging, especially with report of waking up at night due to headaches and waking up in the morning with headaches. She has had visual changes as well. Will also have her be seen by Neurology for additional input.  Discussed possible initiation of TCA for headache but would need to be thoughtful with this given she is on an ssri and taking hydroxyzine at night. May consider discontinuing hydroxyzine and having her take amitriptyline at night to aid with headaches and possibly insomnia. Will not start this now, will first get brain imaging.  - Adult Neurology  Referral; Future  - MR Brain w/o & w Contrast; Future      No follow-ups on file.    TERRELL Miller-S  [facility]     [facility] MICHAELA Fu is a 32 year old accompanied by her self, presenting for the following health issues:  Orders (Consult for a CT due to head injury in the  in 2011) and Headache (Gets migraines weekly )      History of Present Illness       Reason for visit:  [employer] denied [place], appealing, need a CT scan to see if the head injury I got in June of 2011 is effecting me physically. I have had chronic head aches and migraines but never really sought treatment for it.    She eats 2-3 servings of fruits and vegetables daily.She consumes 1 sweetened beverage(s) daily.She exercises with enough effort to increase her heart rate 30 to 60  minutes per day.  She exercises with enough effort to increase her heart rate 3 or less days per week.   She is taking medications regularly.      Headache  Onset: since 2011    Description:   Location: Bilateral in the frontal area and then will either go behind her eyes or to the back of her head.  Character: sharp pain  Frequency:  Usually every other week, sometimes weekly with menses. She is having them at least twice a month but can be as much as 2 times a week.  Duration:  Not more than 3 hours normally, sometimes will last the whole day    Intensity: moderate, severe    Progression of Symptoms:  Worsening, intermittent. Does note that since November 2021, when she was diagnosed with major anxiety/depressive disorder and PTSD, her headaches have been more noticeable.     Accompanying Signs & Symptoms:  Stiff neck: YES  Neck or upper back pain: YES  Fever: No  Sinus pressure: No  Nausea or vomiting: YES  Dizziness: YES  Numbness: YES- bilateral hands and elbows  Weakness: YES  Visual changes: YES- reports occasionally will feel like she is losing her peripheral vision    History:   Head trauma: YES- head injury while in the  in 2011  Family history of migraines: YES- mother  Previous tests for headaches: YES- [employer] had CT done, no records currently, requesting head CT be performed  Neurologist evaluations: no  Able to do daily activities: No-headache are fairly limiting when they occur  Wake with a headaches: YES- 50% of the time they will be there when she wakes up, other times she will get them starting later in the evenings around 7/8pm.   Do headaches wake you up: YES  Daily pain medication use: No daily medication use  Work/school stressors/changes: Has been unable to hold a job for more than 3-6 months due to mental health issues as well as the headaches.     Precipitating factors:   Does light make it worse: YES  Does sound make it worse: YES    Alleviating factors:  Does sleep help:  No  Therapies Tried and outcome: Tylenol and Excedrin which are not fully effective but she feels she can function when she is taking these.    Using hydroxyzine 25 mg every night for insomnia.    Review of Systems   Constitutional, HEENT, cardiovascular, pulmonary, gi and gu systems are negative, except as otherwise noted.        Objective    /60 (BP Location: Right arm, Patient Position: Chair, Cuff Size: Adult Regular)   Pulse 98   Temp 99  F (37.2  C) (Oral)   Resp 12   Wt 80.7 kg (178 lb)   SpO2 98%   BMI 30.02 kg/m    Body mass index is 30.02 kg/m .  Physical Exam   GENERAL: healthy, alert and no distress  RESP: lungs clear to auscultation - no rales, rhonchi or wheezes  CV: regular rate and rhythm, normal S1 S2, no S3 or S4, no murmur, click or rub, no peripheral edema and peripheral pulses strong  MS: no gross musculoskeletal defects noted, no edema  PSYCH: mentation appears normal, affect normal/bright

## 2022-09-27 ENCOUNTER — OFFICE VISIT (OUTPATIENT)
Dept: NEUROLOGY | Facility: CLINIC | Age: 32
End: 2022-09-27
Attending: PHYSICIAN ASSISTANT
Payer: COMMERCIAL

## 2022-09-27 VITALS
WEIGHT: 176 LBS | BODY MASS INDEX: 29.32 KG/M2 | HEIGHT: 65 IN | SYSTOLIC BLOOD PRESSURE: 104 MMHG | DIASTOLIC BLOOD PRESSURE: 67 MMHG | HEART RATE: 66 BPM

## 2022-09-27 DIAGNOSIS — G43.109 MIGRAINE WITH AURA AND WITHOUT STATUS MIGRAINOSUS, NOT INTRACTABLE: ICD-10-CM

## 2022-09-27 PROBLEM — F43.29 ADJUSTMENT DISORDER WITH MIXED EMOTIONAL FEATURES: Status: ACTIVE | Noted: 2022-09-27

## 2022-09-27 PROBLEM — O09.90 SUPERVISION OF HIGH-RISK PREGNANCY: Status: RESOLVED | Noted: 2017-03-17 | Resolved: 2022-09-27

## 2022-09-27 PROCEDURE — 99205 OFFICE O/P NEW HI 60 MIN: CPT | Performed by: PSYCHIATRY & NEUROLOGY

## 2022-09-27 RX ORDER — TOPIRAMATE 50 MG/1
50 TABLET, FILM COATED ORAL 2 TIMES DAILY
Qty: 60 TABLET | Refills: 11 | Status: SHIPPED | OUTPATIENT
Start: 2022-10-27 | End: 2023-06-15

## 2022-09-27 RX ORDER — TOPIRAMATE 25 MG/1
TABLET, FILM COATED ORAL
Qty: 70 TABLET | Refills: 0 | Status: SHIPPED | OUTPATIENT
Start: 2022-09-27 | End: 2022-10-19

## 2022-09-27 NOTE — PROGRESS NOTES
"NEUROLOGY CONSULTATION NOTE       [facility] [place]  [address] [place] [address]  Tel: [phone]  Fax: [phone]  [email or web address]     Nickie Sherman,  1990, MRN [id number]  PCP: Ben Aponte  Date: 2022     ASSESSMENT & PLAN     Visit Diagnosis  1. Chronic nonintractable headache, unspecified headache type     Migraine headache with aura  Pleasant 32-year-old female with history of depression and anxiety who had a injury while in service in  and since then has noticed progressive worsening of her headaches.  She never has been on preventive medicine but as her headache frequency and intensity has increased, I have recommended:    1.  Start Topamax 25 mg daily gradually increasing it to 50 mg twice daily  2.  Use Aleve or ibuprofen for abortive treatment  3.  She is scheduled for MRI scan that was ordered by her primary care physician  4.  Follow-up in 3 months with nurse practitioner follow-up with me in 6 months    Thank you again for this referral, please feel free to contact me if you have any questions.    Dean Salguero MD  [facility]  (Formerly, [facility]     REASON FOR CONSULTATION No chief complaint on file.        HISTORY OF PRESENT ILLNESS     We have been requested by Dr. Aponte to evaluate Nickie Sherman who is a 32 year old  female for headaches    Patient is a 32-year-old female with history of depression and anxiety was referred for admission and management of worsening headaches.  According to patient while in service in  she had head injury.  She was evaluated and apparently had a CT of the head that was normal.  She \"sucked it up\" until recently when she started noticing increased headaches.  These headaches occur 2-3 times a week and are associated with nausea sound and light sensitivity.  These headaches are preceded by some visual symptoms.  She denies any focal " weakness but does have numbness in her fingertips.  She was evaluated by primary care physician and is scheduled for MRI scan.  Usually these headaches respond to aspirin or ibuprofen and she has been taking increased amount of these medication.  She has never tried any preventive medicine.     PROBLEM LIST   Patient Active Problem List   Diagnosis Code     Adjustment disorder with mixed emotional features F43.29         PAST MEDICAL & SURGICAL HISTORY     Past Medical History:   Patient  has a past medical history of Depressive disorder (2014).    Surgical History:  She  has a past surgical history that includes pe tubes (01/01/1998) and Head and neck surgery (2011).     SOCIAL HISTORY     Reviewed, and she  reports that she quit smoking about 8 years ago. Her smoking use included cigarettes. She started smoking about 12 years ago. She smoked 0.00 packs per day for 4.00 years. She has never used smokeless tobacco. She reports current alcohol use. She reports that she does not use drugs.     FAMILY HISTORY     Reviewed, and family history includes Diabetes in her paternal grandmother; Hypertension in her father.     ALLERGIES     No Known Allergies      REVIEW OF SYSTEMS     A 12 point review of system was performed and was negative except as outlined in the history of present illness.     HOME MEDICATIONS     Current Outpatient Rx   Medication Sig Dispense Refill     Acetaminophen (TYLENOL PO)        FLUoxetine (PROZAC) 40 MG capsule Take 1 capsule (40 mg) by mouth daily 90 capsule 2     hydrOXYzine (ATARAX) 25 MG tablet Take 0.5-1 tablets (12.5-25 mg) by mouth nightly as needed for other (insomnia) 90 tablet 3     multivitamin w/minerals (THERA-VIT-M) tablet Take 1 tablet by mouth daily           PHYSICAL EXAM     Vital signs  There were no vitals taken for this visit.    Weight:   0 lbs 0 oz    Patient is alert and oriented x4 in no acute distress. Vital signs were reviewed and are documented in electronic  medical record. Neck was supple, no carotid bruits, thyromegaly, JVD, or lymphadenopathy was noted.   NEUROLOGY EXAM:    Patient s speech was normal with no aphasia or dysarthria. Mentation, and affect were also normal.     Funduscopic exam was normal, with normal cup to disc ratio. Cranial nerves II -XII were intact.     Patient had normal mass, tone and motor strength was 5/5 in all extremities without pronator drift.     Sensation was intact to light touch, pinprick, and vibratory sensation.     Reflexes were 1+ symmetrical with downgoing toes.     No dysmetria noted on FNF or HKS. Romberg was negative.    Gait testing was normal. Able to walk on toes/heels. Tandem walk normal.     PERTINENT DIAGNOSTIC STUDIES     Following studies were reviewed:     No recent imaging studies to review     PERTINENT LABS  Following labs were reviewed:  No visits with results within 3 Month(s) from this visit.   Latest known visit with results is:   Office Visit on 08/17/2021   Component Date Value     Chlamydia trachomatis 08/17/2021 Negative      Neisseria gonorrhoeae 08/17/2021 Negative      WBC Count 08/17/2021 7.0      RBC Count 08/17/2021 4.15      Hemoglobin 08/17/2021 11.6 (A)     Hematocrit 08/17/2021 35.4      MCV 08/17/2021 85      MCH 08/17/2021 28.0      MCHC 08/17/2021 32.8      RDW 08/17/2021 13.6      Platelet Count 08/17/2021 273      Sodium 08/17/2021 137      Potassium 08/17/2021 4.0      Chloride 08/17/2021 107      Carbon Dioxide (CO2) 08/17/2021 26      Anion Gap 08/17/2021 4      Urea Nitrogen 08/17/2021 7      Creatinine 08/17/2021 0.74      Calcium 08/17/2021 8.8      Glucose 08/17/2021 89      Alkaline Phosphatase 08/17/2021 84      AST 08/17/2021 25      ALT 08/17/2021 30      Protein Total 08/17/2021 7.7      Albumin 08/17/2021 4.1      Bilirubin Total 08/17/2021 0.4      GFR Estimate 08/17/2021 >90      Cholesterol 08/17/2021 142      Triglycerides 08/17/2021 61      Direct Measure HDL 08/17/2021 48  (A)     LDL Cholesterol Calculat* 08/17/2021 82      Non HDL Cholesterol 08/17/2021 94      Patient Fasting > 8hrs? 08/17/2021 Unknown      Treponema Antibody Total 08/17/2021 Nonreactive      Hepatitis C Antibody 08/17/2021 Nonreactive      HIV Antigen Antibody Com* 08/17/2021 Nonreactive      Interpretation 08/17/2021 Negative for Intraepithelial Lesion or Malignancy (NILM)      Specimen Adequacy 08/17/2021 Satisfactory for evaluation, endocervical/transformation zone component present      Clinical Information 08/17/2021                      Value:This result contains rich text formatting which cannot be displayed here.     LMP/Menopause Date 08/17/2021                      Value:This result contains rich text formatting which cannot be displayed here.     Reflex Testing 08/17/2021 Yes regardless of result      Previous Abnormal? 08/17/2021                      Value:This result contains rich text formatting which cannot be displayed here.     Performing Labs 08/17/2021                      Value:This result contains rich text formatting which cannot be displayed here.     Other HR HPV 08/17/2021 Negative      HPV16 DNA 08/17/2021 Negative      HPV18 DNA 08/17/2021 Negative      FINAL DIAGNOSIS 08/17/2021                      Value:This result contains rich text formatting which cannot be displayed here.        Total time spent for face to face visit, reviewing labs/imaging studies, counseling and coordination of care was: 1 Hour spent on the date of the encounter doing chart review, review of outside records, review of test results, interpretation of tests, patient visit and documentation       This note was dictated using voice recognition software.  Any grammatical or context distortions are unintentional and inherent to the software.    No orders of the defined types were placed in this encounter.     New Prescriptions    No medications on file      Modified Medications    No medications on file

## 2022-09-27 NOTE — LETTER
2022         RE: Nickie Sherman  [address] [facility] [address]        Dear Colleague,    Thank you for referring your patient, Nickie Sherman, to the [facility] [place]. Please see a copy of my visit note below.    NEUROLOGY CONSULTATION NOTE       [facility] [place]  [address] [place] [address]  Tel: [phone]  Fax: [phone]  [email or web address]     Nickie Sherman,  1990, MRN [id number]  PCP: Ben Aponte  Date: 2022     ASSESSMENT & PLAN     Visit Diagnosis  1. Chronic nonintractable headache, unspecified headache type     Migraine headache with aura  Pleasant 32-year-old female with history of depression and anxiety who had a injury while in service in  and since then has noticed progressive worsening of her headaches.  She never has been on preventive medicine but as her headache frequency and intensity has increased, I have recommended:    1.  Start Topamax 25 mg daily gradually increasing it to 50 mg twice daily  2.  Use Aleve or ibuprofen for abortive treatment  3.  She is scheduled for MRI scan that was ordered by her primary care physician  4.  Follow-up in 3 months with nurse practitioner follow-up with me in 6 months    Thank you again for this referral, please feel free to contact me if you have any questions.    Dean Salguero MD  [facility]  ([facility], [facility]     REASON FOR CONSULTATION No chief complaint on file.        HISTORY OF PRESENT ILLNESS     We have been requested by Dr. Aponte to evaluate Nickie Sherman who is a 32 year old  female for headaches    Patient is a 32-year-old female with history of depression and anxiety was referred for admission and management of worsening headaches.  According to patient while in service in  she had head injury.  She was evaluated and apparently had a CT of the head that was normal.   "She \"sucked it up\" until recently when she started noticing increased headaches.  These headaches occur 2-3 times a week and are associated with nausea sound and light sensitivity.  These headaches are preceded by some visual symptoms.  She denies any focal weakness but does have numbness in her fingertips.  She was evaluated by primary care physician and is scheduled for MRI scan.  Usually these headaches respond to aspirin or ibuprofen and she has been taking increased amount of these medication.  She has never tried any preventive medicine.     PROBLEM LIST   Patient Active Problem List   Diagnosis Code     Adjustment disorder with mixed emotional features F43.29         PAST MEDICAL & SURGICAL HISTORY     Past Medical History:   Patient  has a past medical history of Depressive disorder (2014).    Surgical History:  She  has a past surgical history that includes pe tubes (01/01/1998) and Head and neck surgery (2011).     SOCIAL HISTORY     Reviewed, and she  reports that she quit smoking about 8 years ago. Her smoking use included cigarettes. She started smoking about 12 years ago. She smoked 0.00 packs per day for 4.00 years. She has never used smokeless tobacco. She reports current alcohol use. She reports that she does not use drugs.     FAMILY HISTORY     Reviewed, and family history includes Diabetes in her paternal grandmother; Hypertension in her father.     ALLERGIES     No Known Allergies      REVIEW OF SYSTEMS     A 12 point review of system was performed and was negative except as outlined in the history of present illness.     HOME MEDICATIONS     Current Outpatient Rx   Medication Sig Dispense Refill     Acetaminophen (TYLENOL PO)        FLUoxetine (PROZAC) 40 MG capsule Take 1 capsule (40 mg) by mouth daily 90 capsule 2     hydrOXYzine (ATARAX) 25 MG tablet Take 0.5-1 tablets (12.5-25 mg) by mouth nightly as needed for other (insomnia) 90 tablet 3     multivitamin w/minerals (THERA-VIT-M) tablet " Take 1 tablet by mouth daily           PHYSICAL EXAM     Vital signs  There were no vitals taken for this visit.    Weight:   0 lbs 0 oz    Patient is alert and oriented x4 in no acute distress. Vital signs were reviewed and are documented in electronic medical record. Neck was supple, no carotid bruits, thyromegaly, JVD, or lymphadenopathy was noted.   NEUROLOGY EXAM:    Patient s speech was normal with no aphasia or dysarthria. Mentation, and affect were also normal.     Funduscopic exam was normal, with normal cup to disc ratio. Cranial nerves II -XII were intact.     Patient had normal mass, tone and motor strength was 5/5 in all extremities without pronator drift.     Sensation was intact to light touch, pinprick, and vibratory sensation.     Reflexes were 1+ symmetrical with downgoing toes.     No dysmetria noted on FNF or HKS. Romberg was negative.    Gait testing was normal. Able to walk on toes/heels. Tandem walk normal.     PERTINENT DIAGNOSTIC STUDIES     Following studies were reviewed:     No recent imaging studies to review     PERTINENT LABS  Following labs were reviewed:  No visits with results within 3 Month(s) from this visit.   Latest known visit with results is:   Office Visit on 08/17/2021   Component Date Value     Chlamydia trachomatis 08/17/2021 Negative      Neisseria gonorrhoeae 08/17/2021 Negative      WBC Count 08/17/2021 7.0      RBC Count 08/17/2021 4.15      Hemoglobin 08/17/2021 11.6 (A)     Hematocrit 08/17/2021 35.4      MCV 08/17/2021 85      MCH 08/17/2021 28.0      MCHC 08/17/2021 32.8      RDW 08/17/2021 13.6      Platelet Count 08/17/2021 273      Sodium 08/17/2021 137      Potassium 08/17/2021 4.0      Chloride 08/17/2021 107      Carbon Dioxide (CO2) 08/17/2021 26      Anion Gap 08/17/2021 4      Urea Nitrogen 08/17/2021 7      Creatinine 08/17/2021 0.74      Calcium 08/17/2021 8.8      Glucose 08/17/2021 89      Alkaline Phosphatase 08/17/2021 84      AST 08/17/2021 25       ALT 08/17/2021 30      Protein Total 08/17/2021 7.7      Albumin 08/17/2021 4.1      Bilirubin Total 08/17/2021 0.4      GFR Estimate 08/17/2021 >90      Cholesterol 08/17/2021 142      Triglycerides 08/17/2021 61      Direct Measure HDL 08/17/2021 48 (A)     LDL Cholesterol Calculat* 08/17/2021 82      Non HDL Cholesterol 08/17/2021 94      Patient Fasting > 8hrs? 08/17/2021 Unknown      Treponema Antibody Total 08/17/2021 Nonreactive      Hepatitis C Antibody 08/17/2021 Nonreactive      HIV Antigen Antibody Com* 08/17/2021 Nonreactive      Interpretation 08/17/2021 Negative for Intraepithelial Lesion or Malignancy (NILM)      Specimen Adequacy 08/17/2021 Satisfactory for evaluation, endocervical/transformation zone component present      Clinical Information 08/17/2021                      Value:This result contains rich text formatting which cannot be displayed here.     LMP/Menopause Date 08/17/2021                      Value:This result contains rich text formatting which cannot be displayed here.     Reflex Testing 08/17/2021 Yes regardless of result      Previous Abnormal? 08/17/2021                      Value:This result contains rich text formatting which cannot be displayed here.     Performing Labs 08/17/2021                      Value:This result contains rich text formatting which cannot be displayed here.     Other HR HPV 08/17/2021 Negative      HPV16 DNA 08/17/2021 Negative      HPV18 DNA 08/17/2021 Negative      FINAL DIAGNOSIS 08/17/2021                      Value:This result contains rich text formatting which cannot be displayed here.        Total time spent for face to face visit, reviewing labs/imaging studies, counseling and coordination of care was: 1 Hour spent on the date of the encounter doing chart review, review of outside records, review of test results, interpretation of tests, patient visit and documentation       This note was dictated using voice recognition software.  Any  grammatical or context distortions are unintentional and inherent to the software.    No orders of the defined types were placed in this encounter.     New Prescriptions    No medications on file      Modified Medications    No medications on file              Again, thank you for allowing me to participate in the care of your patient.        Sincerely,        Dean Salguero MD

## 2022-10-08 ENCOUNTER — HOSPITAL ENCOUNTER (OUTPATIENT)
Dept: MRI IMAGING | Facility: CLINIC | Age: 32
Discharge: HOME | End: 2022-10-08
Attending: PHYSICIAN ASSISTANT | Admitting: PHYSICIAN ASSISTANT
Payer: COMMERCIAL

## 2022-10-08 DIAGNOSIS — R51.9 CHRONIC NONINTRACTABLE HEADACHE, UNSPECIFIED HEADACHE TYPE: ICD-10-CM

## 2022-10-08 DIAGNOSIS — G89.29 CHRONIC NONINTRACTABLE HEADACHE, UNSPECIFIED HEADACHE TYPE: ICD-10-CM

## 2022-10-08 PROCEDURE — 70553 MRI BRAIN STEM W/O & W/DYE: CPT

## 2022-10-08 PROCEDURE — 255N000002 HC RX 255 OP 636: Performed by: PHYSICIAN ASSISTANT

## 2022-10-08 PROCEDURE — A9585 GADOBUTROL INJECTION: HCPCS | Performed by: PHYSICIAN ASSISTANT

## 2022-10-08 RX ORDER — GADOBUTROL 604.72 MG/ML
7 INJECTION INTRAVENOUS ONCE
Status: COMPLETED | OUTPATIENT
Start: 2022-10-08 | End: 2022-10-08

## 2022-10-08 RX ADMIN — GADOBUTROL 7 ML: 604.72 INJECTION INTRAVENOUS at 16:46

## 2022-10-17 DIAGNOSIS — G43.109 MIGRAINE WITH AURA AND WITHOUT STATUS MIGRAINOSUS, NOT INTRACTABLE: ICD-10-CM

## 2022-10-19 RX ORDER — TOPIRAMATE 25 MG/1
50 TABLET, FILM COATED ORAL 2 TIMES DAILY
Qty: 180 TABLET | Refills: 1 | Status: SHIPPED | OUTPATIENT
Start: 2022-10-19 | End: 2023-08-07

## 2022-10-19 NOTE — TELEPHONE ENCOUNTER
Signed Prescriptions:                        Disp   Refills    topiramate (TOPAMAX) 25 MG tablet          180 ta*1        Sig: Take 2 tablets (50 mg) by mouth 2 times daily  Authorizing Provider: ULISES BARTON  Ordering User: LOBITO PHELPS LOV note: 9/27/22 Start Topamax 25 mg daily gradually increasing it to 50 mg twice daily    Updated rx and sent refills to pharmacy    Lobito Phelps RN, BSN  [facility]

## 2022-10-23 ENCOUNTER — HEALTH MAINTENANCE LETTER (OUTPATIENT)
Age: 32
End: 2022-10-23

## 2022-11-16 ENCOUNTER — HOSPITAL ENCOUNTER (EMERGENCY)
Facility: CLINIC | Age: 32
Discharge: HOME | End: 2022-11-16
Attending: PHYSICIAN ASSISTANT | Admitting: PHYSICIAN ASSISTANT
Payer: COMMERCIAL

## 2022-11-16 VITALS
TEMPERATURE: 97.5 F | DIASTOLIC BLOOD PRESSURE: 86 MMHG | OXYGEN SATURATION: 96 % | SYSTOLIC BLOOD PRESSURE: 122 MMHG | RESPIRATION RATE: 20 BRPM | HEART RATE: 97 BPM

## 2022-11-16 DIAGNOSIS — J10.1 INFLUENZA A: ICD-10-CM

## 2022-11-16 LAB
FLUAV RNA SPEC QL NAA+PROBE: POSITIVE
FLUBV RNA RESP QL NAA+PROBE: NEGATIVE
RSV RNA SPEC NAA+PROBE: NEGATIVE
SARS-COV-2 RNA RESP QL NAA+PROBE: NEGATIVE

## 2022-11-16 PROCEDURE — 87637 SARSCOV2&INF A&B&RSV AMP PRB: CPT | Performed by: PHYSICIAN ASSISTANT

## 2022-11-16 PROCEDURE — 99283 EMERGENCY DEPT VISIT LOW MDM: CPT

## 2022-11-16 PROCEDURE — C9803 HOPD COVID-19 SPEC COLLECT: HCPCS

## 2022-11-16 PROCEDURE — 87637 SARSCOV2&INF A&B&RSV AMP PRB: CPT | Performed by: EMERGENCY MEDICINE

## 2022-11-16 ASSESSMENT — ENCOUNTER SYMPTOMS
SINUS PAIN: 1
MYALGIAS: 1
COUGH: 1
SINUS PRESSURE: 1
RHINORRHEA: 1
FEVER: 1

## 2022-11-16 ASSESSMENT — ACTIVITIES OF DAILY LIVING (ADL): ADLS_ACUITY_SCORE: 33

## 2022-11-16 NOTE — ED PROVIDER NOTES
History     Chief Complaint:  Cough       HPI   Nickie Sherman is a 32 year old female who presents to the ED for evaluation of cough. Patient reports onset of cough, fever up to 102F, rhinorrhea, congestion, sinus pain/pressure, generalized myalgias that began two days ago. She also notes pharyngitis. Son is ill at home as well with URI. She has been using dayquil and nyquil.     ROS:  Review of Systems   Constitutional: Positive for fever.   HENT: Positive for congestion, rhinorrhea, sinus pressure and sinus pain.    Respiratory: Positive for cough.    Musculoskeletal: Positive for myalgias.   All other systems reviewed and are negative.      Allergies:  No Known Allergies     Medications:    Acetaminophen (TYLENOL PO)  FLUoxetine (PROZAC) 40 MG capsule  hydrOXYzine (ATARAX) 25 MG tablet  multivitamin w/minerals (THERA-VIT-M) tablet  topiramate (TOPAMAX) 25 MG tablet  topiramate (TOPAMAX) 50 MG tablet        Past Medical History:    Past Medical History:   Diagnosis Date     Depressive disorder 2014       Past Surgical History:    Past Surgical History:   Procedure Laterality Date     HEAD & NECK SURGERY  2011    Head injury     PE TUBES  01/01/1998        Family History:    family history includes Diabetes in her paternal grandmother; Hypertension in her father.    Social History:   reports that she quit smoking about 8 years ago. Her smoking use included cigarettes. She started smoking about 12 years ago. She has never used smokeless tobacco. She reports current alcohol use. She reports that she does not use drugs.    PCP: Ben Aponte     Physical Exam     Patient Vitals for the past 24 hrs:   BP Temp Temp src Pulse Resp SpO2   11/16/22 0839 122/86 97.5  F (36.4  C) Temporal 97 20 96 %        Physical Exam  Constitutional: Pleasant. Cooperative.   Eyes: Pupils equally round and reactive  HENT: Head is normal in appearance. TMs normal. Moist mucous membranes. Oropharyngeal erythema. No exudates. No palatal  asymmetry. Uvula midline. No trismus. No muffled voice. Tolerating their secretions.  Cardiovascular: Regular rate and rhythm.  Respiratory: Normal respiratory effort, lungs are clear bilaterally.  Neck: Normal ROM. No preauricular, postauricular, tonsillar, submandibular, submental, or cervical lymphadenopathy.   Skin: Normal, without rash.  Neurologic: Cranial nerves grossly intact. Alert.  Nursing notes and vital signs reviewed.      Emergency Department Course     Laboratory:  Labs Ordered and Resulted from Time of ED Arrival to Time of ED Departure   INFLUENZA A/B & SARS-COV2 PCR MULTIPLEX - Abnormal       Result Value    Influenza A PCR Positive (*)     Influenza B PCR Negative      RSV PCR Negative      SARS CoV2 PCR Negative        Emergency Department Course:    Reviewed:  I reviewed nursing notes, vitals, past medical history and Care Everywhere    Assessments:   I obtained history and examined the patient as noted above.    I rechecked the patient and explained findings.     Interventions:  Medications - No data to display     Disposition:  The patient was discharged to home.     Impression & Plan      Medical Decision Making:  Nickie Sherman is a 32 year old female who presents to ED for evaluation of cough, fever, generalized myalgias.  See HPI as above for additional details.  Vitals and physical exam as above.  Influenza swab returns positive.  I suspect this is etiology of her symptoms.  Low suspicion for alternative nefarious pathology at this time.  Discussed conservative cares.  [place] patient was safe for discharge to home. Discussed reasons to return. All questions answered. Patient discharged to home in stable condition.    Diagnosis:    ICD-10-CM    1. Influenza A  J10.1            Discharge Medications:  New Prescriptions    No medications on file        11/16/2022   Valeriy Barron PA-C     This record was created at least in part using electronic voice recognition software, so please excuse any  typographical errors.       Valeriy Barron PA-C  11/16/22 1105

## 2022-11-16 NOTE — ED TRIAGE NOTES
"A&O x4.  ABC's intact.      Pt arrives with c/o thinks she has a sinus infection causing other issues. Sore throat, dizzy, ear pain, pressure behind eyes, coughing up mucus, nose is stuff/draining and leg cramps. Symptoms started on Monday, & fever started yesterday. \"feeling like crap\"      "

## 2022-11-17 ENCOUNTER — PATIENT OUTREACH (OUTPATIENT)
Dept: CARE COORDINATION | Facility: CLINIC | Age: 32
End: 2022-11-17

## 2022-11-17 NOTE — LETTER
[facility] CARE COORDINATION  [address]  [place] [address]    November 17, 2022    Nickie Sherman  [address] [address] [address]      Dear Nickie,        I am a clinic care coordinator who works with Ben Aponte MD with the [facility]. I wanted to introduce myself and provide you with my contact information for you to be able to call me with any questions or concerns. Below is a description of clinic care coordination and how I can further assist you.       The clinic care coordination team is made up of a registered nurse, , financial resource worker and community health worker who understand the health care system. The goal of clinic care coordination is to help you manage your health and improve access to the health care system. Our team works alongside your provider to assist you in determining your health and social needs. We can help you obtain health care and community resources, providing you with necessary information and education. We can work with you through any barriers and develop a care plan that helps coordinate and strengthen the communication between you and your care team.    Please feel free to contact me with any questions or concerns regarding care coordination and what we can offer.      We are focused on providing you with the highest-quality healthcare experience possible.    Sincerely,     Obinna Mai MSN, RN, PHN, CCM   Primary Care Clinical RN Care Coordinator  [facility]  11/17/2022   8:53 AM  [email or web address]  Office: [phone]

## 2022-11-17 NOTE — PROGRESS NOTES
Clinic Care Coordination Contact  [facility]/Voicemail       Clinical Data: Care Coordinator Outreach  Outreach attempted x 1.  Unable to leave a message on patient's voicemail with call back information and requested return call, as the voicemail was not set up.  Plan: Care Coordinator will send care coordination introduction letter with care coordinator contact information and explanation of care coordination services via [employer]. Care Coordinator will try to reach patient again in 1-2 business days.    Obinna Mai MSN, RN, PHN, CCM   Primary Care Clinical RN Care Coordinator  [facility]  11/17/2022   8:55 AM  [email or web address]  Office: [phone]

## 2022-11-18 NOTE — PROGRESS NOTES
Clinic Care Coordination Contact  [facility]/Voicemail       Clinical Data: Care Coordinator Outreach  Outreach attempted x 3.  Unable to leave a message on patient's voicemail with call back information and requested return call.  Voice mail was not set up.  Plan: Care Coordinator sent care coordination introduction letter on 11/17/22 via [employer]. Care Coordinator will do no further outreaches at this time.    Obinna Mai MSN, RN, PHN, [facility]   Primary Care Clinical RN Care Coordinator  [facility]  11/18/2022   9:47 AM  [email or web address]  Office: [phone]

## 2022-11-25 DIAGNOSIS — G47.00 INSOMNIA, UNSPECIFIED TYPE: ICD-10-CM

## 2022-11-28 RX ORDER — HYDROXYZINE HYDROCHLORIDE 25 MG/1
12.5-25 TABLET, FILM COATED ORAL
Qty: 90 TABLET | Refills: 0 | Status: SHIPPED | OUTPATIENT
Start: 2022-11-28 | End: 2023-09-28

## 2023-06-15 ENCOUNTER — ANCILLARY PROCEDURE (OUTPATIENT)
Dept: GENERAL RADIOLOGY | Facility: CLINIC | Age: 33
End: 2023-06-15
Attending: FAMILY MEDICINE
Payer: COMMERCIAL

## 2023-06-15 ENCOUNTER — OFFICE VISIT (OUTPATIENT)
Dept: FAMILY MEDICINE | Facility: CLINIC | Age: 33
End: 2023-06-15
Payer: COMMERCIAL

## 2023-06-15 VITALS
SYSTOLIC BLOOD PRESSURE: 102 MMHG | DIASTOLIC BLOOD PRESSURE: 64 MMHG | OXYGEN SATURATION: 98 % | WEIGHT: 185 LBS | TEMPERATURE: 98.1 F | HEIGHT: 65 IN | HEART RATE: 64 BPM | RESPIRATION RATE: 18 BRPM | BODY MASS INDEX: 30.82 KG/M2

## 2023-06-15 DIAGNOSIS — R09.89 ABNORMAL FINDING OF LUNG: ICD-10-CM

## 2023-06-15 DIAGNOSIS — R09.89 ABNORMAL FINDING OF LUNG: Primary | ICD-10-CM

## 2023-06-15 DIAGNOSIS — R06.02 SHORTNESS OF BREATH ON EXERTION: ICD-10-CM

## 2023-06-15 DIAGNOSIS — Z77.29 EXPOSURE TO SMOKE FROM INDUSTRIAL SOURCE: ICD-10-CM

## 2023-06-15 PROCEDURE — 99214 OFFICE O/P EST MOD 30 MIN: CPT | Mod: 25 | Performed by: FAMILY MEDICINE

## 2023-06-15 PROCEDURE — 91312 COVID-19 BIVALENT 12+ (PFIZER): CPT | Performed by: FAMILY MEDICINE

## 2023-06-15 PROCEDURE — 0121A COVID-19 BIVALENT 12+ (PFIZER): CPT | Performed by: FAMILY MEDICINE

## 2023-06-15 PROCEDURE — 71046 X-RAY EXAM CHEST 2 VIEWS: CPT | Mod: TC | Performed by: RADIOLOGY

## 2023-06-15 RX ORDER — ALBUTEROL SULFATE 90 UG/1
1-2 AEROSOL, METERED RESPIRATORY (INHALATION) EVERY 6 HOURS PRN
Qty: 18 G | Refills: 0 | Status: SHIPPED | OUTPATIENT
Start: 2023-06-15 | End: 2024-08-02

## 2023-06-15 ASSESSMENT — PATIENT HEALTH QUESTIONNAIRE - PHQ9
SUM OF ALL RESPONSES TO PHQ QUESTIONS 1-9: 13
10. IF YOU CHECKED OFF ANY PROBLEMS, HOW DIFFICULT HAVE THESE PROBLEMS MADE IT FOR YOU TO DO YOUR WORK, TAKE CARE OF THINGS AT HOME, OR GET ALONG WITH OTHER PEOPLE: SOMEWHAT DIFFICULT
SUM OF ALL RESPONSES TO PHQ QUESTIONS 1-9: 13

## 2023-06-15 NOTE — PROGRESS NOTES
"  Assessment & Plan     Abnormal finding of lung  - XR Chest 2 Views; Future  - Adult Pulmonary Medicine Referral; Future  Shortness of breath on exertion  - Adult Pulmonary Medicine Referral; Future  - albuterol (PROAIR HFA/PROVENTIL HFA/VENTOLIN HFA) 108 (90 Base) MCG/ACT inhaler; Inhale 1-2 puffs into the lungs every 6 hours as needed for shortness of breath, wheezing or cough  Exposure to smoke from industrial source  - Adult Pulmonary Medicine Referral; Future  - albuterol (PROAIR HFA/PROVENTIL HFA/VENTOLIN HFA) 108 (90 Base) MCG/ACT inhaler; Inhale 1-2 puffs into the lungs every 6 hours as needed for shortness of breath, wheezing or cough  EHR reviewed.   Past medical history, problem list, past surgical history, family history, social history, medications reviewed, updated, reconciled.   No records or imaging can be found in the EHR.   Patient is a bit suspicious of the xray reading and wants to repeat today. Chest xray was collected again here in clinic. Her recent PFT results are also not available.   We discussed the possibility of exercise induced asthma, possible treatment, further evaluation. She could try albuterol now, has used a nebulizer before after she ran the marathon. We discussed evaluation by pulmonology, but that time her PFT records could be reviewed and they can discuss further recommendations for the management of her symptoms.   Bivalent vaccine today.   Encouraged to keep up with mental health and neurology.   Follow up as needed for this.        BMI:   Estimated body mass index is 30.82 kg/m  as calculated from the following:    Height as of this encounter: 1.65 m (5' 4.96\").    Weight as of this encounter: 83.9 kg (185 lb).     Depression Screening Follow Up        6/15/2023     9:11 AM   PHQ   PHQ-9 Total Score 13   Q9: Thoughts of better off dead/self-harm past 2 weeks Not at all     Sandro Sears MD  [facility]   Nickie is a 33 year old, " presenting for the following health issues:  [facility] ([facility], wants to get it checked out) and Asthma (Possible asthma symptoms, has been getting worse within past year )    [age] old female here with some concerns of abnormal xray findings and possible asthma. [facility] four [facility]. Has been having some possible asthma symptoms over the last year. She was sent for imaging there. She was just told that she has a 'collapsed lung' and advised to be seen. She feels very well today. Has not had chest pain, trouble breathing, cough lately. She notes over the last year however, with any physical exertion or exercise, she feels short of breath, a little chest tightness. No severe cough. No noisy breathing. She did do a breathing test with an inhaler already [facility] as [facility]. She does not have any results and also does not have any xray results.     She denies childhood asthma or allergies.   No family history of lung disease.   She notes several years ago in [place] while in the  she was continuously exposed to burn pits and worries this is the problem.   Quit smoking cigarettes thirteen years ago.     Current treatment for anxiety and migraines going well.         6/15/2023    10:19 AM   Additional Questions   Roomed by Tia   Accompanied by Daughter     History of Present Illness       Reason for visit:  Bre[facility]  Symptom onset:  More than a month  Symptoms include:  Asthma related breathing issues during heavy exercise  Symptom intensity:  Mild  Symptom progression:  Worsening  Had these symptoms before:  Yes  Has tried/received treatment for these symptoms:  No  What makes it worse:  Heavy exercise  What makes it better:  Relaxing, yoga, focused breathing    Nickie Sherman eats 2-3 servings of fruits and vegetables daily.Nickie A Lane consumes 2 sweetened beverage(s) daily.Nickie A  "Lane exercises with enough effort to increase Nickie Sherman's heart rate 30 to 60 minutes per day.  Nickie Sherman exercises with enough effort to increase Nickie Sherman's heart rate 3 or less days per week.   Nickie Sherman is taking medications regularly.    Today's PHQ-9         PHQ-9 Total Score: 13    PHQ-9 Q9 Thoughts of better off dead/self-harm past 2 weeks :   Not at all    How difficult have these problems made it for you to do your work, take care of things at home, or get along with other people: Somewhat difficult           Objective    /64 (BP Location: Left arm, Patient Position: Sitting, Cuff Size: Adult Regular)   Pulse 64   Temp 98.1  F (36.7  C) (Temporal)   Resp 18   Ht 1.65 m (5' 4.96\")   Wt 83.9 kg (185 lb)   LMP 06/01/2023 (Approximate)   SpO2 98%   BMI 30.82 kg/m    Body mass index is 30.82 kg/m .  Physical Exam   GENERAL: healthy, alert and no distress  EYES: Eyes grossly normal to inspection, PERRL and conjunctivae and sclerae normal  HENT: ear canals and TM's normal, nose and mouth without ulcers or lesions  NECK: no adenopathy, no asymmetry, masses, or scars and thyroid normal to palpation  RESP: lungs clear to auscultation - no rales, rhonchi or wheezes  CV: regular rate and rhythm, normal S1 S2, no S3 or S4, no murmur, click or rub, no peripheral edema and peripheral pulses strong  MS: no gross musculoskeletal defects noted, no edema  NEURO: Normal strength and tone, mentation intact and speech normal  PSYCH: mentation appears normal, affect normal/bright    Xray-pending                  "

## 2023-06-16 ENCOUNTER — TELEPHONE (OUTPATIENT)
Dept: PULMONOLOGY | Facility: CLINIC | Age: 33
End: 2023-06-16
Payer: COMMERCIAL

## 2023-06-16 NOTE — TELEPHONE ENCOUNTER
Patient Contacted    Appointment type: NEW  Provider: ABISAI  Return date: 8/7/23  Specialty phone number: [phone]  Additional appointment(s) needed: FPFT  Additonal Notes: Pt declined mailed itinerary. Pt preferred [facility] location for pulmonary appt and [facility] for FPFT.

## 2023-08-03 ENCOUNTER — OFFICE VISIT (OUTPATIENT)
Dept: NURSING | Facility: CLINIC | Age: 33
End: 2023-08-03
Payer: COMMERCIAL

## 2023-08-03 VITALS — BODY MASS INDEX: 30.62 KG/M2 | HEIGHT: 65 IN | WEIGHT: 183.8 LBS

## 2023-08-03 DIAGNOSIS — R06.09 DYSPNEA ON EXERTION: Primary | ICD-10-CM

## 2023-08-03 PROCEDURE — 94060 EVALUATION OF WHEEZING: CPT | Performed by: INTERNAL MEDICINE

## 2023-08-03 PROCEDURE — 94726 PLETHYSMOGRAPHY LUNG VOLUMES: CPT | Performed by: INTERNAL MEDICINE

## 2023-08-03 PROCEDURE — 94729 DIFFUSING CAPACITY: CPT | Performed by: INTERNAL MEDICINE

## 2023-08-04 LAB
DLCOUNC-%PRED-PRE: 97 %
DLCOUNC-PRE: 21.82 ML/MIN/MMHG
DLCOUNC-PRED: 22.36 ML/MIN/MMHG
ERV-%PRED-PRE: 73 %
ERV-PRE: 0.98 L
ERV-PRED: 1.34 L
EXPTIME-PRE: 6.79 SEC
FEF2575-%PRED-POST: 109 %
FEF2575-%PRED-PRE: 58 %
FEF2575-POST: 3.66 L/SEC
FEF2575-PRE: 1.95 L/SEC
FEF2575-PRED: 3.34 L/SEC
FEFMAX-%PRED-PRE: 99 %
FEFMAX-PRE: 7.11 L/SEC
FEFMAX-PRED: 7.13 L/SEC
FEV1-%PRED-PRE: 88 %
FEV1-PRE: 2.67 L
FEV1FEV6-PRE: 75 %
FEV1FEV6-PRED: 85 %
FEV1FVC-PRE: 73 %
FEV1FVC-PRED: 85 %
FEV1SVC-PRE: 68 %
FEV1SVC-PRED: 74 %
FIFMAX-PRE: 2.94 L/SEC
FRCPLETH-%PRED-PRE: 88 %
FRCPLETH-PRE: 2.38 L
FRCPLETH-PRED: 2.67 L
FVC-%PRED-PRE: 102 %
FVC-PRE: 3.65 L
FVC-PRED: 3.57 L
IC-%PRED-PRE: 110 %
IC-PRE: 2.93 L
IC-PRED: 2.65 L
RVPLETH-%PRED-PRE: 112 %
RVPLETH-PRE: 1.39 L
RVPLETH-PRED: 1.23 L
TLCPLETH-%PRED-PRE: 99 %
TLCPLETH-PRE: 5.31 L
TLCPLETH-PRED: 5.33 L
VA-%PRED-PRE: 103 %
VA-PRE: 5.18 L
VC-%PRED-PRE: 95 %
VC-PRE: 3.92 L
VC-PRED: 4.09 L

## 2023-08-07 ENCOUNTER — OFFICE VISIT (OUTPATIENT)
Dept: PULMONOLOGY | Facility: CLINIC | Age: 33
End: 2023-08-07
Payer: COMMERCIAL

## 2023-08-07 VITALS
WEIGHT: 183 LBS | OXYGEN SATURATION: 99 % | SYSTOLIC BLOOD PRESSURE: 110 MMHG | BODY MASS INDEX: 30.49 KG/M2 | DIASTOLIC BLOOD PRESSURE: 72 MMHG | HEART RATE: 68 BPM | HEIGHT: 65 IN

## 2023-08-07 DIAGNOSIS — R06.02 SHORTNESS OF BREATH ON EXERTION: ICD-10-CM

## 2023-08-07 DIAGNOSIS — J30.9 ALLERGIC SINUSITIS: ICD-10-CM

## 2023-08-07 DIAGNOSIS — R09.89 ABNORMAL FINDING OF LUNG: ICD-10-CM

## 2023-08-07 DIAGNOSIS — J45.50 SEVERE PERSISTENT ASTHMA, UNSPECIFIED WHETHER COMPLICATED (H): Primary | ICD-10-CM

## 2023-08-07 DIAGNOSIS — K21.9 GASTROESOPHAGEAL REFLUX DISEASE, UNSPECIFIED WHETHER ESOPHAGITIS PRESENT: ICD-10-CM

## 2023-08-07 DIAGNOSIS — Z77.29 EXPOSURE TO SMOKE FROM INDUSTRIAL SOURCE: ICD-10-CM

## 2023-08-07 PROCEDURE — 99204 OFFICE O/P NEW MOD 45 MIN: CPT | Performed by: INTERNAL MEDICINE

## 2023-08-07 RX ORDER — BUDESONIDE AND FORMOTEROL FUMARATE DIHYDRATE 160; 4.5 UG/1; UG/1
2 AEROSOL RESPIRATORY (INHALATION) 2 TIMES DAILY
Qty: 1 G | Refills: 4 | Status: SHIPPED | OUTPATIENT
Start: 2023-08-07 | End: 2024-09-04

## 2023-08-07 NOTE — PROGRESS NOTES
Pulmonary Clinic Note   08/07/2023      PCP: Paloma Alcocer    Reason for visit: New patient for FERRARA      Pulmonary HPI:   Nickie Sherman is a 33 year old adult w/ h/o migraine with aura and adjustment disorder, who presents for evaluation of dyspnea on exertion.  Patient was referred from primary care provider due to shortness of breath.  [facility] and she was told in the past she has collapsed lung, but x-ray recently in June showed no such finding.  She has been having symptoms that are suspicious for asthma for the last year.  Patient was given albuterol for possible exercise-induced asthma.  She was referred to pulmonary.  The patient states that over the last year she has been having activity limitation due to dyspnea on exertion.  Her shortness of breath is mainly triggered by activities such as running, she feels her chest is very tight, similar to when she gets a panic attack but without the other symptoms for the panic attack, she does not hear wheezing, no significant cough.  She had dyspnea occasionally while resting without any activities, but this is very infrequent.  She denied any nocturnal symptoms.  She was started on albuterol by her primary care provider, and she feels significant improvement with her symptoms when she uses the albuterol after symptoms are triggered.  Patient had significant sinus congestion with postnasal drip, this is usually seasonal, during the spring and fall.  She uses Flonase intermittently for it.  She also has daily heartburn, she typically has late meals just before bedtime, she uses water and milk to help with the symptoms.    Patient was told in the past that she did have asthma, but she was never treated with inhaler in the past.  She served in the , she was on active duty between 2009 and 2017, for few years she served in the [place], during that time she did have exposure to burn pits.      Patient used to be smoker, she  quit smoking in , she smoked between  and , quantities were variable between few cigarettes a day to a pack a day especially when she was on active duty.  She has occasional EtOH use.  No vaping or recreational drug use.   Patient works as a .  She just finished her bachelor degree in communication.    Patient's outside records were reviewed via [facility] Everywhere &/or available paper records (sent for scanning).     Review of systems: a complete 12-point ROS conducted, & found to be negative w/ exceptions as noted in the HPI.    Past medical history:  Past Medical History:   Diagnosis Date    Depressive disorder        Past Surgical History:   Procedure Laterality Date    HEAD & NECK SURGERY      Head injury    PE TUBES  1998       Social history:  Social History     Tobacco Use    Smoking status: Former     Packs/day: 0.00     Years: 4.00     Pack years: 0.00     Types: Cigarettes     Start date: 3/15/2010     Quit date: 2014     Years since quittin.1    Smokeless tobacco: Never    Tobacco comments:     Used while on active duty   Vaping Use    Vaping Use: Never used   Substance Use Topics    Alcohol use: Yes     Comment: 1-2 week    Drug use: No       Family history:  Family History   Problem Relation Age of Onset    Hypertension Father     Diabetes Paternal Grandmother        Medications:  Current Outpatient Medications   Medication    Acetaminophen (TYLENOL PO)    albuterol (PROAIR HFA/PROVENTIL HFA/VENTOLIN HFA) 108 (90 Base) MCG/ACT inhaler    budesonide-formoterol (SYMBICORT) 160-4.5 MCG/ACT Inhaler    FLUoxetine (PROZAC) 40 MG capsule    hydrOXYzine (ATARAX) 25 MG tablet    multivitamin w/minerals (THERA-VIT-M) tablet     No current facility-administered medications for this visit.       Allergies:  No Known Allergies    Physical examination:  /72 (BP Location: Right arm, Patient Position: Sitting, Cuff Size: Adult Regular)   Pulse 68   Ht 1.645 m  "(5' 4.75\")   Wt 83 kg (183 lb)   LMP 06/01/2023 (Approximate)   SpO2 99%   BMI 30.69 kg/m      General: NAD  Eyes: Anicteric sclera, PERRL, EOMI  Mouth: MMM w/o lesions; no tonsillar enlargement; erythema is noted in the oral pharynx  CV: RRR, no m/c/r;   Lungs: Clear to auscultation bilaterally, no rales, rhonchi or wheezing.  Abd: Soft, NT, ND  Ext: No BLE edema.  No clubbing  Skin: No rashes, cyanosis, or jaundice  Neuro: Intact mentation w/ normal speech. Normal strength & muscle tone w/ normal gait & stance  Psych: Euthymic, normal affect, good eye contact    Labs: reviewed in [employer] & personally interpreted.   Absolute eosinophils 100 on 11/18/2020 2016.  CBC from August 2021, showed normal WBC and platelets, mild anemia with hemoglobin 11.6, normal MCV at 85.    Imaging: reviewed in [employer] & personally interpreted. Below are the interpretations from the formal Radiology review.  Chest x-ray on 6/15/2023  No acute abnormal findings, no infiltrate, no significant pleural effusion    PFT:  8/3/2023: Mild airflow obstruction, there is significant bronchodilator response.      Impression & recommendations:    Nickie was seen today for consult.    Diagnoses and all orders for this visit:    Severe persistent asthma, unspecified whether complicated  -     budesonide-formoterol (SYMBICORT) 160-4.5 MCG/ACT Inhaler; Inhale 2 puffs into the lungs 2 times daily for 30 days    Abnormal finding of lung  -     Adult Pulmonary Medicine Referral    Shortness of breath on exertion  -     Adult Pulmonary Medicine Referral    Exposure to smoke from industrial source  -     Adult Pulmonary Medicine Referral    Allergic sinusitis    Gastroesophageal reflux disease, unspecified whether esophagitis present      This is a 53-year-old female patient with past medical history significant for migraine and adjustment disorder.  The patient was referred to pulmonary clinic due to dyspnea on exertion.  Her symptoms are consistent with " asthma, with component of exercise-induced asthma.  She has allergic features with allergic sinusitis and postnasal drip.  GERD could be also triggering small airway inflammation with microaspiration.  Her PFT supports asthma diagnosis with a significant bronchodilator response, she started to develop airway obstruction.  She uses a rescue inhaler 3 to 4 days a week, but multiple times during the day.  Overall her asthma is classified as moderate to severe persistent asthma, with her airway obstruction and activity limitation I would start her on high-dose ICS/LABA in addition to the albuterol as needed and reassess in 3 months for stepdown treatment.    #1 severe persistent asthma  -Start the patient on Symbicort twice daily  -Continue albuterol as needed  -Given the exercise-induced asthma, I recommend the patient to use albuterol 30 to 60 minutes before her exercise session  -I will follow-up with her 3-month to reassess response to treatment and the need for stepup or stepdown  -No lab at this point, but if she has refractory symptoms despite the above we will consider lab evaluation for phenotyping      #2 allergic sinusitis  -Recommended Flonase and Zyrtec over-the-counter    #3 GERD  -Recommended lifestyle modification, avoid large meals 3 to 4 hours before bedtime, elevate the head of the bed by 30 degrees, avoid triggering spicy food and meals.  -Can trial over-the-counter Prilosec 20 mg once daily if her symptoms are persistent    RTC in 3 months     Chart documentation was completed, in part, with [employer] voice-recognition software. Even though reviewed, some grammatical, spelling, and word errors may remain.          Sandra Alvarez MD   Pulmonary and Critical Care

## 2023-08-07 NOTE — PATIENT INSTRUCTIONS
Start taking Symbicort twice a day   Continue the albuterol as needed for chest tightness and shortness of breath   Use albuterol 30 - 60 minutes before exercise   Use flonase and Zyrtec over the counter for sinusitis   Avoid large meals 3-4 hours before bedtime and elevate the head of the bed by 30 degree

## 2023-09-27 ENCOUNTER — TELEPHONE (OUTPATIENT)
Dept: PULMONOLOGY | Facility: CLINIC | Age: 33
End: 2023-09-27
Payer: COMMERCIAL

## 2023-09-27 DIAGNOSIS — F41.9 ANXIETY: ICD-10-CM

## 2023-09-27 DIAGNOSIS — F32.A DEPRESSION, UNSPECIFIED DEPRESSION TYPE: ICD-10-CM

## 2023-09-27 RX ORDER — FLUOXETINE 40 MG/1
40 CAPSULE ORAL DAILY
Qty: 90 CAPSULE | Refills: 0 | Status: SHIPPED | OUTPATIENT
Start: 2023-09-27 | End: 2023-10-11

## 2023-09-27 NOTE — TELEPHONE ENCOUNTER
CALLED PT (1ST ATTEMPT) for the patient to call back and schedule the following:    Appointment type: MARK  Provider: ABISAI  Return date: NEXT AVAILABLE  Specialty phone number: [phone]  Additional appointment(s) needed: NA  Additonal Notes: RESCHEDULE NEEDED. PT'S VM BOX NOT SET UP.

## 2023-09-28 DIAGNOSIS — G47.00 INSOMNIA, UNSPECIFIED TYPE: ICD-10-CM

## 2023-09-28 RX ORDER — HYDROXYZINE HYDROCHLORIDE 25 MG/1
12.5-25 TABLET, FILM COATED ORAL
Qty: 90 TABLET | Refills: 0 | Status: SHIPPED | OUTPATIENT
Start: 2023-09-28 | End: 2023-10-11

## 2023-10-03 ENCOUNTER — TELEPHONE (OUTPATIENT)
Dept: PULMONOLOGY | Facility: CLINIC | Age: 33
End: 2023-10-03
Payer: COMMERCIAL

## 2023-10-03 NOTE — TELEPHONE ENCOUNTER
Patient Contacted     Appointment type: RTA  Provider: ABISAI  Return date: 11/6/23  Specialty phone number: [phone]  Additional appointment(s) needed: NA  Additonal Notes: rescheduled.

## 2023-10-11 ENCOUNTER — OFFICE VISIT (OUTPATIENT)
Dept: FAMILY MEDICINE | Facility: CLINIC | Age: 33
End: 2023-10-11
Payer: COMMERCIAL

## 2023-10-11 VITALS
TEMPERATURE: 98.8 F | HEART RATE: 69 BPM | OXYGEN SATURATION: 98 % | DIASTOLIC BLOOD PRESSURE: 62 MMHG | RESPIRATION RATE: 17 BRPM | BODY MASS INDEX: 31.09 KG/M2 | WEIGHT: 185.4 LBS | SYSTOLIC BLOOD PRESSURE: 103 MMHG

## 2023-10-11 DIAGNOSIS — G47.00 INSOMNIA, UNSPECIFIED TYPE: Primary | ICD-10-CM

## 2023-10-11 DIAGNOSIS — F32.A DEPRESSION, UNSPECIFIED DEPRESSION TYPE: ICD-10-CM

## 2023-10-11 DIAGNOSIS — F41.9 ANXIETY: ICD-10-CM

## 2023-10-11 PROCEDURE — 99214 OFFICE O/P EST MOD 30 MIN: CPT | Performed by: PHYSICIAN ASSISTANT

## 2023-10-11 RX ORDER — HYDROXYZINE HYDROCHLORIDE 25 MG/1
25 TABLET, FILM COATED ORAL EVERY 6 HOURS PRN
Qty: 90 TABLET | Refills: 5 | Status: SHIPPED | OUTPATIENT
Start: 2023-10-11 | End: 2024-07-09

## 2023-10-11 ASSESSMENT — PATIENT HEALTH QUESTIONNAIRE - PHQ9
SUM OF ALL RESPONSES TO PHQ QUESTIONS 1-9: 13
SUM OF ALL RESPONSES TO PHQ QUESTIONS 1-9: 13
10. IF YOU CHECKED OFF ANY PROBLEMS, HOW DIFFICULT HAVE THESE PROBLEMS MADE IT FOR YOU TO DO YOUR WORK, TAKE CARE OF THINGS AT HOME, OR GET ALONG WITH OTHER PEOPLE: VERY DIFFICULT

## 2023-10-11 ASSESSMENT — ANXIETY QUESTIONNAIRES
IF YOU CHECKED OFF ANY PROBLEMS ON THIS QUESTIONNAIRE, HOW DIFFICULT HAVE THESE PROBLEMS MADE IT FOR YOU TO DO YOUR WORK, TAKE CARE OF THINGS AT HOME, OR GET ALONG WITH OTHER PEOPLE: SOMEWHAT DIFFICULT
1. FEELING NERVOUS, ANXIOUS, OR ON EDGE: MORE THAN HALF THE DAYS
3. WORRYING TOO MUCH ABOUT DIFFERENT THINGS: SEVERAL DAYS
6. BECOMING EASILY ANNOYED OR IRRITABLE: SEVERAL DAYS
4. TROUBLE RELAXING: SEVERAL DAYS
2. NOT BEING ABLE TO STOP OR CONTROL WORRYING: SEVERAL DAYS
GAD7 TOTAL SCORE: 8
7. FEELING AFRAID AS IF SOMETHING AWFUL MIGHT HAPPEN: MORE THAN HALF THE DAYS
5. BEING SO RESTLESS THAT IT IS HARD TO SIT STILL: NOT AT ALL
GAD7 TOTAL SCORE: 8

## 2023-10-11 ASSESSMENT — ENCOUNTER SYMPTOMS: NERVOUS/ANXIOUS: 1

## 2023-10-11 ASSESSMENT — ASTHMA QUESTIONNAIRES: ACT_TOTALSCORE: 21

## 2023-10-11 NOTE — COMMUNITY RESOURCES LIST (ENGLISH)
10/11/2023   [facility]  N/A  For questions about this resource list or additional care needs, please contact your primary care clinic or care manager.  Phone: [phone]   Email: N/A   Address: [address] [place] [address]   Hours: N/A        Financial Stability       Rent and mortgage payment assistance  1  [facility] - Rent Assistance Distance: 1.65 miles      In-Person   [address] [place] [address]  Language: English, Upper sorbian  Hours: Mon - Sat 8:00 AM - 12:00 PM , Mon - Tue 1:00 PM - 8:00 PM , Wed 1:00 PM - 4:00 PM , Thu - Fri 1:00 PM - 8:00 PM , Sat 12:30 PM - 4:00 PM  Fees: Free   Phone: (963) 158-8488 Email: [email or web address] Website: [email or web address]/[email or web address]     2  [employer] [facility] in Practice of [place] (emploi.us) - Home Save Program Distance: 2.87 miles      Phone/Virtual   [address] [place] [address]  Language: English, Upper sorbian  Hours: Mon - Fri 9:00 AM - 5:00 PM  Fees: Free   Phone: (538) 449-1422 Email: services@[employer] Website: https://www.[employer].[facility]          Transportation       Free or low-cost transportation  3  [facility] Distance: 4.4 miles      In-Person   [address] [place] [address]  Language: English  Hours: Mon - Wed 9:30 AM - 12:00 PM , Mon - Wed 1:00 PM - 2:00 PM Appt. Only  Fees: Free   Phone: (303) 544-2731 Email: info@[employer].org Website: http://www.[employer]aintol[employer].org/     4  [facility] - Bus Passes Distance: 4.59 miles      In-Person   [address] [place] [address]  Language: English  Hours: Tue 9:30 AM - 11:30 AM , Thu 9:30 AM - 11:30 AM  Fees: Free   Phone: (426) 880-7737 Email: info@[employer] Website: http://www.[facility].org/     Transportation to medical appointments  5  [employer]    [facility]ness (AIFW) Distance: 3.24 miles       In-Person   [address] [place] [address]  Language: Upper sorbian, Persian, English, Gujarati, Shay, French, Armenian, Syriac, Slovenian, Hebrew  Hours: Mon - Wed 9:00 AM - 5:00 PM , Thu 12:00 PM - 6:00 PM , Fri 9:00 AM - 5:00 PM , Sun 10:30 AM - 2:00 PM Appt. Only  Fees: Free   Phone: (577) 352-3241 Email: info@[employer] Website: https://www.[employer].org/     6  [facility] - [place] and [facility] Distance: 5.64 miles      In-Person   [address] [place] [address]  Language: English  Hours: Mon - Sun Open 24 Hours  Fees: Insurance, Self Pay   Phone: (996) 180-2028 Email: jovanni@[employer] Website: https://www.[employer]/          Important Numbers & Websites       Emergency Services   911  City Services   311  Poison Control   (898) 263-9452  Suicide Prevention Lifeline   (982) 744-5877 (TALK)  Child Abuse Hotline   (635) 361-4377 (4-A-Child)  Sexual Assault Hotline   (610) 388-5469 (HOPE)  National Runaway Safeline   (932) 186-4146 (RUNAWAY)  All-Options Talkline   (936) 679-9185  Substance Abuse Referral   (414) 774-8138 (HELP)

## 2023-10-11 NOTE — PATIENT INSTRUCTIONS
Natalya Stacey has EMPATH unit (in the ER- for psychiatry)      Crisis Text Line  http://www.crisistextline.org     The Crisis Text Line serves anyone, in any type of crisis, providing access to free, 24/7 support and information via the medium people already use and trust:     Here's how it works:  Text 447-059 from anywhere in the [place], anytime, about any type of crisis.  A live, trained Crisis Counselor receives the text and responds quickly.  The volunteer Crisis Counselor will help you move from a 'hot moment to a cool moment'

## 2023-10-11 NOTE — LETTER
October 11, 2023      Nickie Sherman  [address] [facility] [address]        To Whom It May Concern:    Nickie Sherman was seen in our clinic. Nickie Sherman please excuse her from work 10/12/23 due to a medical condition.       Sincerely,        Paloma Alcocer PA-C

## 2023-10-11 NOTE — PROGRESS NOTES
"  Assessment & Plan   Problem List Items Addressed This Visit      Will discontinue Fluoxetine and begin Sertraline 50 mg. Start Sertraline tonight, 10/11. Patient educated that it may take 2-4 weeks to see an improvement in symptoms. Given resources for counseling, and discussed EMPATH program at [facility] as well as crisis lines for emergent sxs/suicidal ideation. [facility]      Discussed that sleep, depression, and anxiety are all connected. Will increase Hydroxyzine dose to 25 mg. Can take this up to three times per day, 6-8 hours apart. Continue to use it for anxiety symptoms and to sleep.     Patient was agreeable to the plan and will follow up in 4 weeks for an Evisit.     Visit Diagnoses       Encounter for screening involving social determinants of health (SDoH)    -  Primary    Insomnia, unspecified type        Relevant Medications    hydrOXYzine (ATARAX) 25 MG tablet    sertraline (ZOLOFT) 50 MG tablet    Anxiety        Relevant Medications    hydrOXYzine (ATARAX) 25 MG tablet    sertraline (ZOLOFT) 50 MG tablet    Depression, unspecified depression type        Relevant Medications    hydrOXYzine (ATARAX) 25 MG tablet    sertraline (ZOLOFT) 50 MG tablet                   BMI:   Estimated body mass index is 31.09 kg/m  as calculated from the following:    Height as of 8/7/23: 1.645 m (5' 4.75\").    Weight as of this encounter: 84.1 kg (185 lb 6.4 oz).       Depression Screening Follow Up        10/11/2023     8:33 AM   PHQ   PHQ-9 Total Score 13   Q9: Thoughts of better off dead/self-harm past 2 weeks Several days   F/U: Thoughts of suicide or self-harm Yes   F/U: Self harm-plan Yes   F/U: Self-harm action No   F/U: Safety concerns No                 Follow Up    Follow Up Actions Taken  Crisis resource information provided in the After Visit Summary    Discussed the following ways the patient can remain in a safe environment:  be around others      Paloma Alcocer, " "TERRELL Reddy student on 10/11/2023 at 11:37 AM  [facility]  The student TERRELL Lockwood-S2 acted as a scribe and the encounter documented above was completely performed by myself and the documentation reflects the work I have performed today.   Paloma Alcocer PA-C       Richard Fu is a 33 year old, presenting for the following health issues:  Depression, Anxiety, and Health Maintenance        10/11/2023     9:46 AM   Additional Questions   Roomed by man mccord   Patient would like to discuss switching Fluoxetine d/t the drug not working and side effects. The side effects are hand shakiness and mental fog which affect her job and hobbies. She first noticed the shakiness 2 months ago with it worsening after starting the asthma medication.    In addition, patient has chronic insomnia since  training that she would like to discuss. Patient has prescribed Hydroxyzine for panic attacks and sleep. She has been taking the Hydroxyzine once per week for panic attacks and takes it most nights for sleep. No morning drowsiness.States it has not been working for sleep but occasionally helps with panic attacks.     Patient notes new stressors with new job and her father's poor health. Two weeks ago she was actively suicidal with a plan (she did not say what the plan was). \"Being very blunt and honest with \" and lots of sleep helped. She is not currently suicidal. She is interested in counseling services. Has been inconsistently seeing [facility] but cannot regularly d/t cost.     No further complaints noted.    History of Present Illness       Mental Health Follow-up:  Patient presents to follow-up on Depression & Anxiety.Patient's depression since last visit has been:  Worse  The patient is having other symptoms associated with depression.  Patient's anxiety since last visit has been:  Better  The patient is having other symptoms associated with anxiety.  Any significant life " events: job concerns, financial concerns and grief or loss  Patient is feeling anxious or having panic attacks.  Patient has no concerns about alcohol or drug use.    Nickie Sherman eats 2-3 servings of fruits and vegetables daily.Nickie Sehrman consumes 2 sweetened beverage(s) daily.Nickie Scottkin exercises with enough effort to increase Nickie Scottkin's heart rate 30 to 60 minutes per day.  Nickie Scottkin exercises with enough effort to increase Nickie Scottkin's heart rate 3 or less days per week. Nickie Sherman is missing 1 dose(s) of medications per week.  Nickie Sherman is not taking prescribed medications regularly due to side effects and remembering to take.                 Review of Systems   Psychiatric/Behavioral:  The patient is nervous/anxious.       Objective    /62 (BP Location: Left arm, Patient Position: Chair, Cuff Size: Adult Regular)   Pulse 69   Temp 98.8  F (37.1  C) (Oral)   Resp 17   Wt 84.1 kg (185 lb 6.4 oz)   LMP 10/10/2023   SpO2 98%   BMI 31.09 kg/m    Body mass index is 31.09 kg/m .   Patient is alert and oriented, she is slightly tearful but engaged in conversation. Good eye contact.

## 2023-11-06 ASSESSMENT — ANXIETY QUESTIONNAIRES
IF YOU CHECKED OFF ANY PROBLEMS ON THIS QUESTIONNAIRE, HOW DIFFICULT HAVE THESE PROBLEMS MADE IT FOR YOU TO DO YOUR WORK, TAKE CARE OF THINGS AT HOME, OR GET ALONG WITH OTHER PEOPLE: NOT DIFFICULT AT ALL
GAD7 TOTAL SCORE: 3
3. WORRYING TOO MUCH ABOUT DIFFERENT THINGS: NOT AT ALL
GAD7 TOTAL SCORE: 3
1. FEELING NERVOUS, ANXIOUS, OR ON EDGE: SEVERAL DAYS
2. NOT BEING ABLE TO STOP OR CONTROL WORRYING: NOT AT ALL
4. TROUBLE RELAXING: SEVERAL DAYS
6. BECOMING EASILY ANNOYED OR IRRITABLE: SEVERAL DAYS
7. FEELING AFRAID AS IF SOMETHING AWFUL MIGHT HAPPEN: NOT AT ALL
5. BEING SO RESTLESS THAT IT IS HARD TO SIT STILL: NOT AT ALL

## 2023-11-07 ENCOUNTER — VIRTUAL VISIT (OUTPATIENT)
Dept: FAMILY MEDICINE | Facility: CLINIC | Age: 33
End: 2023-11-07
Payer: COMMERCIAL

## 2023-11-07 DIAGNOSIS — F41.9 ANXIETY: ICD-10-CM

## 2023-11-07 DIAGNOSIS — F32.A DEPRESSION, UNSPECIFIED DEPRESSION TYPE: ICD-10-CM

## 2023-11-07 DIAGNOSIS — G47.00 INSOMNIA, UNSPECIFIED TYPE: ICD-10-CM

## 2023-11-07 PROCEDURE — 99214 OFFICE O/P EST MOD 30 MIN: CPT | Mod: 95 | Performed by: PHYSICIAN ASSISTANT

## 2023-11-07 RX ORDER — SERTRALINE HYDROCHLORIDE 100 MG/1
100 TABLET, FILM COATED ORAL DAILY
Qty: 60 TABLET | Refills: 0 | Status: SHIPPED | OUTPATIENT
Start: 2023-11-07 | End: 2024-01-09

## 2023-11-07 ASSESSMENT — PATIENT HEALTH QUESTIONNAIRE - PHQ9: SUM OF ALL RESPONSES TO PHQ QUESTIONS 1-9: 9

## 2023-11-07 NOTE — PROGRESS NOTES
"Nickie is a 33 year old who is being evaluated via a billable video visit.      How would you like to obtain your AVS? MyChart  If the video visit is dropped, the invitation should be resent by: Text to cell phone: [phone]  Will anyone else be joining your video visit? No          Assessment & Plan     Insomnia, unspecified type  Anxiety  GAD7 decreased from 8 to 3. Improved symptoms.   - sertraline (ZOLOFT) 100 MG tablet; Take 1 tablet (100 mg) by mouth daily for 60 days    Depression, unspecified depression type  PHQ9 decreased from 13 to 9. No thoughts of self harm. Suggest a follow up in about 6 weeks to ensure continued improvement and no recurrence of self harm thoughts. Patient aware of Crisis line as well as [facility] unit if symptoms seem to be worsening. She has no additional questions today.  - sertraline (ZOLOFT) 100 MG tablet; Take 1 tablet (100 mg) by mouth daily for 60 days             BMI:   Estimated body mass index is 31.09 kg/m  as calculated from the following:    Height as of 8/7/23: 1.645 m (5' 4.75\").    Weight as of 10/11/23: 84.1 kg (185 lb 6.4 oz).           Noris Thrasher PA-C  [facility] SUMAN Fu is a 33 year old, presenting for the following health issues:  Depression      11/7/2023    11:56 AM   Additional Questions   Roomed by An V.         11/7/2023    11:56 AM   Patient Reported Additional Medications   Patient reports taking the following new medications None       History of Present Illness       Mental Health Follow-up:  Patient presents to follow-up on Depression & Anxiety.Patient's depression since last visit has been:  Good  The patient is not having other symptoms associated with depression.  Patient's anxiety since last visit has been:  Good  The patient is not having other symptoms associated with anxiety.  Any significant life events: financial concerns  Patient is not feeling anxious or having panic attacks.  Patient has no concerns " about alcohol or drug use.    Nickie Sherman eats 2-3 servings of fruits and vegetables daily.Nickie Sherman consumes 1 sweetened beverage(s) daily.Nickie Sherman exercises with enough effort to increase Nickie Sherman's heart rate 30 to 60 minutes per day.  Nickie Sherman exercises with enough effort to increase Nickie Sherman's heart rate 3 or less days per week.   Nickie Sherman is taking medications regularly.     Sleeping a lot better. Suicidal thoughts very much minimalized. Difficulty concentrating.     Hasn't been having panic attacks.     Yesterday - panic attack feelings. Wrote down all of the things on her to do list - overwhelmed.         6/15/2023     9:11 AM 10/11/2023     8:33 AM 11/7/2023    11:56 AM   PHQ   PHQ-9 Total Score 13 13 9   Q9: Thoughts of better off dead/self-harm past 2 weeks Not at all Several days Not at all   F/U: Thoughts of suicide or self-harm  Yes    F/U: Self harm-plan  Yes    F/U: Self-harm action  No    F/U: Safety concerns  No              8/30/2022    12:15 PM 10/11/2023     8:35 AM 11/6/2023     3:00 PM   SAILAJA-7 SCORE   Total Score 7 (mild anxiety) 8 (mild anxiety) 3 (minimal anxiety)   Total Score 7 8 3                     Review of Systems         Objective           Vitals:  No vitals were obtained today due to virtual visit.    Physical Exam   GENERAL: Healthy, alert and no distress  EYES: Eyes grossly normal to inspection.  No discharge or erythema, or obvious scleral/conjunctival abnormalities.  RESP: No audible wheeze, cough, or visible cyanosis.  No visible retractions or increased work of breathing.    SKIN: Visible skin clear. No significant rash, abnormal pigmentation or lesions.  NEURO: Cranial nerves grossly intact.  Mentation and speech appropriate for age.  PSYCH: Mentation appears normal, affect normal/bright, judgement and insight intact, normal speech and appearance well-groomed.                Video-Visit Details    Type of service:  Video Visit   Video  Start Time: 12:16 PM  Video End Time:12:26 PM    Originating Location (pt. Location): Home    Distant Location (provider location):  On-site  Platform used for Video Visit: Luly

## 2023-11-11 ENCOUNTER — HEALTH MAINTENANCE LETTER (OUTPATIENT)
Age: 33
End: 2023-11-11

## 2023-12-03 DIAGNOSIS — G47.00 INSOMNIA, UNSPECIFIED TYPE: ICD-10-CM

## 2023-12-03 DIAGNOSIS — F32.A DEPRESSION, UNSPECIFIED DEPRESSION TYPE: ICD-10-CM

## 2023-12-03 DIAGNOSIS — F41.9 ANXIETY: ICD-10-CM

## 2023-12-04 RX ORDER — SERTRALINE HYDROCHLORIDE 100 MG/1
100 TABLET, FILM COATED ORAL DAILY
Qty: 90 TABLET | Refills: 1 | OUTPATIENT
Start: 2023-12-04 | End: 2024-02-02

## 2024-01-09 ENCOUNTER — VIRTUAL VISIT (OUTPATIENT)
Dept: FAMILY MEDICINE | Facility: CLINIC | Age: 34
End: 2024-01-09
Payer: COMMERCIAL

## 2024-01-09 DIAGNOSIS — F41.9 ANXIETY: ICD-10-CM

## 2024-01-09 DIAGNOSIS — F32.A DEPRESSION, UNSPECIFIED DEPRESSION TYPE: ICD-10-CM

## 2024-01-09 DIAGNOSIS — G47.00 INSOMNIA, UNSPECIFIED TYPE: ICD-10-CM

## 2024-01-09 PROCEDURE — 99213 OFFICE O/P EST LOW 20 MIN: CPT | Mod: 95 | Performed by: PHYSICIAN ASSISTANT

## 2024-01-09 RX ORDER — SERTRALINE HYDROCHLORIDE 100 MG/1
100 TABLET, FILM COATED ORAL DAILY
Qty: 30 TABLET | Refills: 1 | OUTPATIENT
Start: 2024-01-09

## 2024-01-09 RX ORDER — SERTRALINE HYDROCHLORIDE 100 MG/1
100 TABLET, FILM COATED ORAL DAILY
Qty: 90 TABLET | Refills: 1 | Status: SHIPPED | OUTPATIENT
Start: 2024-01-09 | End: 2024-07-09

## 2024-01-09 ASSESSMENT — ANXIETY QUESTIONNAIRES
7. FEELING AFRAID AS IF SOMETHING AWFUL MIGHT HAPPEN: SEVERAL DAYS
IF YOU CHECKED OFF ANY PROBLEMS ON THIS QUESTIONNAIRE, HOW DIFFICULT HAVE THESE PROBLEMS MADE IT FOR YOU TO DO YOUR WORK, TAKE CARE OF THINGS AT HOME, OR GET ALONG WITH OTHER PEOPLE: SOMEWHAT DIFFICULT
4. TROUBLE RELAXING: SEVERAL DAYS
GAD7 TOTAL SCORE: 8
3. WORRYING TOO MUCH ABOUT DIFFERENT THINGS: MORE THAN HALF THE DAYS
5. BEING SO RESTLESS THAT IT IS HARD TO SIT STILL: NOT AT ALL
1. FEELING NERVOUS, ANXIOUS, OR ON EDGE: SEVERAL DAYS
2. NOT BEING ABLE TO STOP OR CONTROL WORRYING: SEVERAL DAYS
6. BECOMING EASILY ANNOYED OR IRRITABLE: MORE THAN HALF THE DAYS
GAD7 TOTAL SCORE: 8
7. FEELING AFRAID AS IF SOMETHING AWFUL MIGHT HAPPEN: SEVERAL DAYS
8. IF YOU CHECKED OFF ANY PROBLEMS, HOW DIFFICULT HAVE THESE MADE IT FOR YOU TO DO YOUR WORK, TAKE CARE OF THINGS AT HOME, OR GET ALONG WITH OTHER PEOPLE?: SOMEWHAT DIFFICULT
GAD7 TOTAL SCORE: 8

## 2024-01-09 ASSESSMENT — PATIENT HEALTH QUESTIONNAIRE - PHQ9
10. IF YOU CHECKED OFF ANY PROBLEMS, HOW DIFFICULT HAVE THESE PROBLEMS MADE IT FOR YOU TO DO YOUR WORK, TAKE CARE OF THINGS AT HOME, OR GET ALONG WITH OTHER PEOPLE: SOMEWHAT DIFFICULT
SUM OF ALL RESPONSES TO PHQ QUESTIONS 1-9: 12
SUM OF ALL RESPONSES TO PHQ QUESTIONS 1-9: 12

## 2024-01-09 ASSESSMENT — ENCOUNTER SYMPTOMS: NERVOUS/ANXIOUS: 1

## 2024-01-09 NOTE — PROGRESS NOTES
"Nickie is a 33 year old who is being evaluated via a billable video visit.      How would you like to obtain your AVS? MyChart  If the video visit is dropped, the invitation should be resent by: Text to cell phone: [phone]  Will anyone else be joining your video visit? No          Assessment & Plan     Insomnia, unspecified type  Anxiety  Patient reports worsening symptoms due to working on full time employment - but overall feels she can better manage her symptoms while on the medication. Return in 6 months for a physical.  - sertraline (ZOLOFT) 100 MG tablet; Take 1 tablet (100 mg) by mouth daily    Depression, unspecified depression type  Worsening symptoms currently - but aware they are situational. Able to better manage symptoms. Return in 6 months for a physical  - sertraline (ZOLOFT) 100 MG tablet; Take 1 tablet (100 mg) by mouth daily             BMI:   Estimated body mass index is 31.09 kg/m  as calculated from the following:    Height as of 8/7/23: 1.645 m (5' 4.75\").    Weight as of 10/11/23: 84.1 kg (185 lb 6.4 oz).   Weight management plan: Discussed healthy diet and exercise guidelines    Depression Screening Follow Up        1/9/2024     9:44 AM   PHQ   PHQ-9 Total Score 12   Q9: Thoughts of better off dead/self-harm past 2 weeks Not at all         Follow Up Actions Taken  Follow up recommended: 6 mo          MILVIA [facility] SUMAN Fu is a 33 year old, presenting for the following health issues:  Depression and Anxiety        1/9/2024    10:39 AM   Additional Questions   Roomed by An DEEPTI.         1/9/2024    10:39 AM   Patient Reported Additional Medications   Patient reports taking the following new medications none       History of Present Illness       Mental Health Follow-up:  Patient presents to follow-up on Depression & Anxiety.Patient's depression since last visit has been:  Good  The patient is not having other symptoms associated with " depression.  Patient's anxiety since last visit has been:  Better  The patient is not having other symptoms associated with anxiety.  Any significant life events: job concerns and financial concerns  Patient is not feeling anxious or having panic attacks.  Patient has no concerns about alcohol or drug use.    Nickie Sherman eats 2-3 servings of fruits and vegetables daily.Nickie Sherman consumes 2 sweetened beverage(s) daily.Nickie Scottkin exercises with enough effort to increase Nickie Scottkin's heart rate 20 to 29 minutes per day.  Nickie Scottkin exercises with enough effort to increase Nickie Scottkin's heart rate 3 or less days per week.   Nickie Sherman is taking medications regularly.     No thoughts of self harm. Feels like things are a bit heightened as she is working to get back into full time work.   Is a .     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   Vaping Use    Vaping Use: Never used   Substance Use Topics    Alcohol use: Yes     Comment: 1-2 week    Drug use: No         10/11/2023     8:33 AM 2023    11:56 AM 2024     9:44 AM   PHQ   PHQ-9 Total Score 13 9 12   Q9: Thoughts of better off dead/self-harm past 2 weeks Several days Not at all Not at all   F/U: Thoughts of suicide or self-harm Yes     F/U: Self harm-plan Yes     F/U: Self-harm action No     F/U: Safety concerns No           10/11/2023     8:35 AM 2023     3:00 PM 2024     9:46 AM   SAILAJA-7 SCORE   Total Score 8 (mild anxiety) 3 (minimal anxiety) 8 (mild anxiety)   Total Score 8 3 8         Suicide Assessment Five-step Evaluation and Treatment (SAFE-T)          Review of Systems   Psychiatric/Behavioral:  The patient  is nervous/anxious.             Objective           Vitals:  No vitals were obtained today due to virtual visit.    Physical Exam   GENERAL: Healthy, alert and no distress  EYES: Eyes grossly normal to inspection.  No discharge or erythema, or obvious scleral/conjunctival abnormalities.  RESP: No audible wheeze, cough, or visible cyanosis.  No visible retractions or increased work of breathing.    SKIN: Visible skin clear. No significant rash, abnormal pigmentation or lesions.  NEURO: Cranial nerves grossly intact.  Mentation and speech appropriate for age.  PSYCH: Mentation appears normal, affect normal/bright, judgement and insight intact, normal speech and appearance well-groomed.                Video-Visit Details    Type of service:  Video Visit   Video Start Time: 11:46 AM  Video End Time:11:56 AM    Originating Location (pt. Location): Home    Distant Location (provider location):  On-site  Platform used for Video Visit: MessageParty

## 2024-06-04 ASSESSMENT — ASTHMA QUESTIONNAIRES
ACT_TOTALSCORE: 18
QUESTION_1 LAST FOUR WEEKS HOW MUCH OF THE TIME DID YOUR ASTHMA KEEP YOU FROM GETTING AS MUCH DONE AT WORK, SCHOOL OR AT HOME: A LITTLE OF THE TIME
ACT_TOTALSCORE: 18
QUESTION_2 LAST FOUR WEEKS HOW OFTEN HAVE YOU HAD SHORTNESS OF BREATH: THREE TO SIX TIMES A WEEK
QUESTION_3 LAST FOUR WEEKS HOW OFTEN DID YOUR ASTHMA SYMPTOMS (WHEEZING, COUGHING, SHORTNESS OF BREATH, CHEST TIGHTNESS OR PAIN) WAKE YOU UP AT NIGHT OR EARLIER THAN USUAL IN THE MORNING: NOT AT ALL
QUESTION_5 LAST FOUR WEEKS HOW WOULD YOU RATE YOUR ASTHMA CONTROL: SOMEWHAT CONTROLLED
QUESTION_4 LAST FOUR WEEKS HOW OFTEN HAVE YOU USED YOUR RESCUE INHALER OR NEBULIZER MEDICATION (SUCH AS ALBUTEROL): TWO OR THREE TIMES PER WEEK

## 2024-06-04 ASSESSMENT — PATIENT HEALTH QUESTIONNAIRE - PHQ9
SUM OF ALL RESPONSES TO PHQ QUESTIONS 1-9: 8
SUM OF ALL RESPONSES TO PHQ QUESTIONS 1-9: 8
10. IF YOU CHECKED OFF ANY PROBLEMS, HOW DIFFICULT HAVE THESE PROBLEMS MADE IT FOR YOU TO DO YOUR WORK, TAKE CARE OF THINGS AT HOME, OR GET ALONG WITH OTHER PEOPLE: SOMEWHAT DIFFICULT

## 2024-06-05 ENCOUNTER — VIRTUAL VISIT (OUTPATIENT)
Dept: FAMILY MEDICINE | Facility: CLINIC | Age: 34
End: 2024-06-05
Payer: COMMERCIAL

## 2024-06-05 DIAGNOSIS — Z30.8 ENCOUNTER FOR OTHER CONTRACEPTIVE MANAGEMENT: Primary | ICD-10-CM

## 2024-06-05 PROCEDURE — 99213 OFFICE O/P EST LOW 20 MIN: CPT | Mod: 95 | Performed by: PHYSICIAN ASSISTANT

## 2024-06-05 RX ORDER — ETONOGESTREL AND ETHINYL ESTRADIOL VAGINAL RING .015; .12 MG/D; MG/D
RING VAGINAL
Qty: 3 EACH | Refills: 3 | Status: SHIPPED | OUTPATIENT
Start: 2024-06-05

## 2024-06-05 RX ORDER — NORGESTREL 0.07 MG/1
1 TABLET ORAL DAILY
COMMUNITY
Start: 2024-04-21 | End: 2024-06-05

## 2024-06-05 NOTE — PROGRESS NOTES
Nickie is a 34 year old who is being evaluated via a billable video visit.    How would you like to obtain your AVS? MyChart  If the video visit is dropped, the invitation should be resent by: Text to cell phone: [phone]  Will anyone else be joining your video visit? No      Assessment & Plan     Encounter for other contraceptive management  Will switch to the ring for ease and estrogen to help with not having to take the same time everyday.  She will let us know if migraines change on it. Otherwise follow up yearly.    - etonogestrel-ethinyl estradiol (NUVARING) 0.12-0.015 MG/24HR vaginal ring; Insert one (1) ring vaginally and leave in place for 3 consecutive weeks (21 days), then remove for 1 week.                Subjective   Nickie is a 34 year old, presenting for the following health issues:  Contraception        6/5/2024     2:48 PM   Additional Questions   Roomed by Adia   Accompanied by self     Video Start Time: 3:06 PM    History of Present Illness       Reason for visit:  Birth control    Nickie eats 2-3 servings of fruits and vegetables daily.Nickie consumes 1 sweetened beverage(s) daily.Nickie exercises with enough effort to increase Nickie's heart rate 20 to 29 minutes per day.  Nickie exercises with enough effort to increase Nickie's heart rate 3 or less days per week. Nickie is missing 1 dose(s) of medications per week.  Nickie is not taking prescribed medications regularly due to remembering to take.       BC request -was getting a birth control over the counter but now would like a prescription.  Has done patch and other pill.  Does note it is hard to take the same time daily.  Migraines are improved.  No hx of clots.                 Objective           Vitals:  No vitals were obtained today due to virtual visit.    Physical Exam   GENERAL: alert and no distress  EYES: Eyes grossly normal to inspection.  No discharge or erythema, or obvious scleral/conjunctival abnormalities.  RESP: No audible wheeze,  cough, or visible cyanosis.    SKIN: Visible skin clear. No significant rash, abnormal pigmentation or lesions.  NEURO: Cranial nerves grossly intact.  Mentation and speech appropriate for age.  PSYCH: Appropriate affect, tone, and pace of words          Video-Visit Details    Type of service:  Video Visit   Video End Time:3:13 PM  Originating Location (pt. Location): Home    Distant Location (provider location):  On-site  Platform used for Video Visit: Luly  Signed Electronically by: Fatou Bridges PA-C

## 2024-07-07 SDOH — HEALTH STABILITY: PHYSICAL HEALTH: ON AVERAGE, HOW MANY DAYS PER WEEK DO YOU ENGAGE IN MODERATE TO STRENUOUS EXERCISE (LIKE A BRISK WALK)?: 3 DAYS

## 2024-07-07 SDOH — HEALTH STABILITY: PHYSICAL HEALTH: ON AVERAGE, HOW MANY MINUTES DO YOU ENGAGE IN EXERCISE AT THIS LEVEL?: 60 MIN

## 2024-07-07 ASSESSMENT — ASTHMA QUESTIONNAIRES
QUESTION_3 LAST FOUR WEEKS HOW OFTEN DID YOUR ASTHMA SYMPTOMS (WHEEZING, COUGHING, SHORTNESS OF BREATH, CHEST TIGHTNESS OR PAIN) WAKE YOU UP AT NIGHT OR EARLIER THAN USUAL IN THE MORNING: NOT AT ALL
QUESTION_1 LAST FOUR WEEKS HOW MUCH OF THE TIME DID YOUR ASTHMA KEEP YOU FROM GETTING AS MUCH DONE AT WORK, SCHOOL OR AT HOME: SOME OF THE TIME
ACT_TOTALSCORE: 20
QUESTION_4 LAST FOUR WEEKS HOW OFTEN HAVE YOU USED YOUR RESCUE INHALER OR NEBULIZER MEDICATION (SUCH AS ALBUTEROL): ONCE A WEEK OR LESS
QUESTION_5 LAST FOUR WEEKS HOW WOULD YOU RATE YOUR ASTHMA CONTROL: WELL CONTROLLED
QUESTION_2 LAST FOUR WEEKS HOW OFTEN HAVE YOU HAD SHORTNESS OF BREATH: ONCE OR TWICE A WEEK
ACT_TOTALSCORE: 20

## 2024-07-07 ASSESSMENT — SOCIAL DETERMINANTS OF HEALTH (SDOH): HOW OFTEN DO YOU GET TOGETHER WITH FRIENDS OR RELATIVES?: ONCE A WEEK

## 2024-07-09 ENCOUNTER — OFFICE VISIT (OUTPATIENT)
Dept: FAMILY MEDICINE | Facility: CLINIC | Age: 34
End: 2024-07-09
Attending: PHYSICIAN ASSISTANT
Payer: COMMERCIAL

## 2024-07-09 ENCOUNTER — LAB (OUTPATIENT)
Dept: LAB | Facility: CLINIC | Age: 34
End: 2024-07-09
Payer: COMMERCIAL

## 2024-07-09 VITALS
OXYGEN SATURATION: 100 % | HEIGHT: 64 IN | TEMPERATURE: 98.2 F | DIASTOLIC BLOOD PRESSURE: 77 MMHG | HEART RATE: 72 BPM | SYSTOLIC BLOOD PRESSURE: 122 MMHG | WEIGHT: 190.6 LBS | RESPIRATION RATE: 20 BRPM | BODY MASS INDEX: 32.54 KG/M2

## 2024-07-09 DIAGNOSIS — Z00.00 ROUTINE GENERAL MEDICAL EXAMINATION AT A HEALTH CARE FACILITY: Primary | ICD-10-CM

## 2024-07-09 DIAGNOSIS — F32.A DEPRESSION, UNSPECIFIED DEPRESSION TYPE: ICD-10-CM

## 2024-07-09 DIAGNOSIS — J45.50 SEVERE PERSISTENT ASTHMA, UNSPECIFIED WHETHER COMPLICATED (H): ICD-10-CM

## 2024-07-09 DIAGNOSIS — N92.6 IRREGULAR PERIODS: ICD-10-CM

## 2024-07-09 DIAGNOSIS — F41.9 ANXIETY: ICD-10-CM

## 2024-07-09 DIAGNOSIS — G47.00 INSOMNIA, UNSPECIFIED TYPE: ICD-10-CM

## 2024-07-09 LAB
ERYTHROCYTE [DISTWIDTH] IN BLOOD BY AUTOMATED COUNT: 14.6 % (ref 10–15)
HCT VFR BLD AUTO: 33 % (ref 35–53)
HGB BLD-MCNC: 10.5 G/DL (ref 11.7–17.7)
MCH RBC QN AUTO: 24.6 PG (ref 26.5–33)
MCHC RBC AUTO-ENTMCNC: 31.8 G/DL (ref 31.5–36.5)
MCV RBC AUTO: 78 FL (ref 78–100)
PLATELET # BLD AUTO: 285 10E3/UL (ref 150–450)
RBC # BLD AUTO: 4.26 10E6/UL (ref 3.8–5.9)
WBC # BLD AUTO: 7.1 10E3/UL (ref 4–11)

## 2024-07-09 PROCEDURE — 90651 9VHPV VACCINE 2/3 DOSE IM: CPT | Performed by: PHYSICIAN ASSISTANT

## 2024-07-09 PROCEDURE — 85027 COMPLETE CBC AUTOMATED: CPT

## 2024-07-09 PROCEDURE — 36415 COLL VENOUS BLD VENIPUNCTURE: CPT

## 2024-07-09 PROCEDURE — 96127 BRIEF EMOTIONAL/BEHAV ASSMT: CPT | Performed by: PHYSICIAN ASSISTANT

## 2024-07-09 PROCEDURE — 90471 IMMUNIZATION ADMIN: CPT | Performed by: PHYSICIAN ASSISTANT

## 2024-07-09 PROCEDURE — 99395 PREV VISIT EST AGE 18-39: CPT | Mod: 25 | Performed by: PHYSICIAN ASSISTANT

## 2024-07-09 PROCEDURE — 82728 ASSAY OF FERRITIN: CPT

## 2024-07-09 PROCEDURE — 99214 OFFICE O/P EST MOD 30 MIN: CPT | Mod: 25 | Performed by: PHYSICIAN ASSISTANT

## 2024-07-09 RX ORDER — HYDROXYZINE HYDROCHLORIDE 25 MG/1
25 TABLET, FILM COATED ORAL EVERY 6 HOURS PRN
Qty: 90 TABLET | Refills: 5 | Status: SHIPPED | OUTPATIENT
Start: 2024-07-09 | End: 2024-08-13

## 2024-07-09 RX ORDER — SERTRALINE HYDROCHLORIDE 100 MG/1
100 TABLET, FILM COATED ORAL DAILY
Qty: 90 TABLET | Refills: 3 | Status: SHIPPED | OUTPATIENT
Start: 2024-07-09

## 2024-07-09 ASSESSMENT — ANXIETY QUESTIONNAIRES
2. NOT BEING ABLE TO STOP OR CONTROL WORRYING: MORE THAN HALF THE DAYS
4. TROUBLE RELAXING: MORE THAN HALF THE DAYS
3. WORRYING TOO MUCH ABOUT DIFFERENT THINGS: MORE THAN HALF THE DAYS
8. IF YOU CHECKED OFF ANY PROBLEMS, HOW DIFFICULT HAVE THESE MADE IT FOR YOU TO DO YOUR WORK, TAKE CARE OF THINGS AT HOME, OR GET ALONG WITH OTHER PEOPLE?: SOMEWHAT DIFFICULT
1. FEELING NERVOUS, ANXIOUS, OR ON EDGE: MORE THAN HALF THE DAYS
7. FEELING AFRAID AS IF SOMETHING AWFUL MIGHT HAPPEN: SEVERAL DAYS
GAD7 TOTAL SCORE: 13
GAD7 TOTAL SCORE: 13
IF YOU CHECKED OFF ANY PROBLEMS ON THIS QUESTIONNAIRE, HOW DIFFICULT HAVE THESE PROBLEMS MADE IT FOR YOU TO DO YOUR WORK, TAKE CARE OF THINGS AT HOME, OR GET ALONG WITH OTHER PEOPLE: SOMEWHAT DIFFICULT
5. BEING SO RESTLESS THAT IT IS HARD TO SIT STILL: SEVERAL DAYS
GAD7 TOTAL SCORE: 13
6. BECOMING EASILY ANNOYED OR IRRITABLE: NEARLY EVERY DAY
7. FEELING AFRAID AS IF SOMETHING AWFUL MIGHT HAPPEN: SEVERAL DAYS

## 2024-07-09 ASSESSMENT — PATIENT HEALTH QUESTIONNAIRE - PHQ9
SUM OF ALL RESPONSES TO PHQ QUESTIONS 1-9: 12
SUM OF ALL RESPONSES TO PHQ QUESTIONS 1-9: 12
10. IF YOU CHECKED OFF ANY PROBLEMS, HOW DIFFICULT HAVE THESE PROBLEMS MADE IT FOR YOU TO DO YOUR WORK, TAKE CARE OF THINGS AT HOME, OR GET ALONG WITH OTHER PEOPLE: SOMEWHAT DIFFICULT

## 2024-07-09 NOTE — PATIENT INSTRUCTIONS
Patient Education   Preventive Care Advice   This is general advice given by our system to help you stay healthy. However, your care team may have specific advice just for you. Please talk to your care team about your preventive care needs.  Nutrition  Eat 5 or more servings of fruits and vegetables each day.  Try wheat bread, brown rice and whole grain pasta (instead of white bread, rice, and pasta).  Get enough calcium and vitamin D. Check the label on foods and aim for 100% of the RDA (recommended daily allowance).  Lifestyle  Exercise at least 150 minutes each week  (30 minutes a day, 5 days a week).  Do muscle strengthening activities 2 days a week. These help control your weight and prevent disease.  No smoking.  Wear sunscreen to prevent skin cancer.  Have a dental exam and cleaning every 6 months.  Yearly exams  See your health care team every year to talk about:  Any changes in your health.  Any medicines your care team has prescribed.  Preventive care, family planning, and ways to prevent chronic diseases.  Shots (vaccines)   HPV shots (up to age 26), if you've never had them before.  Hepatitis B shots (up to age 59), if you've never had them before.  COVID-19 shot: Get this shot when it's due.  Flu shot: Get a flu shot every year.  Tetanus shot: Get a tetanus shot every 10 years.  Pneumococcal, hepatitis A, and RSV shots: Ask your care team if you need these based on your risk.  Shingles shot (for age 50 and up)  General health tests  Diabetes screening:  Starting at age 35, Get screened for diabetes at least every 3 years.  If you are younger than age 35, ask your care team if you should be screened for diabetes.  Cholesterol test: At age 39, start having a cholesterol test every 5 years, or more often if advised.  Bone density scan (DEXA): At age 50, ask your care team if you should have this scan for osteoporosis (brittle bones).  Hepatitis C: Get tested at least once in your life.  STIs (sexually  transmitted infections)  Before age 24: Ask your care team if you should be screened for STIs.  After age 24: Get screened for STIs if you're at risk. You are at risk for STIs (including HIV) if:  You are sexually active with more than one person.  You don't use condoms every time.  You or a partner was diagnosed with a sexually transmitted infection.  If you are at risk for HIV, ask about PrEP medicine to prevent HIV.  Get tested for HIV at least once in your life, whether you are at risk for HIV or not.  Cancer screening tests  Cervical cancer screening: If you have a cervix, begin getting regular cervical cancer screening tests starting at age 21.  Breast cancer scan (mammogram): If you've ever had breasts, begin having regular mammograms starting at age 40. This is a scan to check for breast cancer.  Colon cancer screening: It is important to start screening for colon cancer at age 45.  Have a colonoscopy test every 10 years (or more often if you're at risk) Or, ask your provider about stool tests like a FIT test every year or Cologuard test every 3 years.  To learn more about your testing options, visit:   .  For help making a decision, visit:   https://bit.ly/fx70130.  Prostate cancer screening test: If you have a prostate, ask your care team if a prostate cancer screening test (PSA) at age 55 is right for you.  Lung cancer screening: If you are a current or former smoker ages 50 to 80, ask your care team if ongoing lung cancer screenings are right for you.  For informational purposes only. Not to replace the advice of your health care provider. Copyright   2023 [facility]. All rights reserved. Clinically reviewed by the [facility]. [employer] 185643 - REV 01/24.  Learning About Depression Screening  What is depression screening?  Depression screening is a way to see if you have depression symptoms. It may be done by a doctor or counselor. It's often part of a routine  "checkup. That's because your mental health is just as important as your physical health.  Depression is a mental health condition that affects how you feel, think, and act. You may:  Have less energy.  Lose interest in your daily activities.  Feel sad and grouchy for a long time.  Depression is very common. It affects people of all ages.  Many things can lead to depression. Some people become depressed after they have a stroke or find out they have a major illness like cancer or heart disease. The death of a loved one or a breakup may lead to depression. It can run in families. Most experts believe that a combination of inherited genes and stressful life events can cause it.  What happens during screening?  You may be asked to fill out a form about your depression symptoms. You and the doctor will discuss your answers. The doctor may ask you more questions to learn more about how you think, act, and feel.  What happens after screening?  If you have symptoms of depression, your doctor will talk to you about your options.  Doctors usually treat depression with medicines or counseling. Often, combining the two works best. Many people don't get help because they think that they'll get over the depression on their own. But people with depression may not get better unless they get treatment.  The cause of depression is not well understood. There may be many factors involved. But if you have depression, it's not your fault.  A serious symptom of depression is thinking about death or suicide. If you or someone you care about talks about this or about feeling hopeless, get help right away.  It's important to know that depression can be treated. Medicine, counseling, and self-care may help.  Where can you learn more?  Go to https://www.healthwise.net/patiented  Enter T185 in the search box to learn more about \"Learning About Depression Screening.\"  Current as of: June 24, 2023               Content Version: 14.0    5300-2277 " [employer], [place].   Care instructions adapted under license by your healthcare professional. If you have questions about a medical condition or this instruction, always ask your healthcare professional. [employer] disclaims any warranty or liability for your use of this information.

## 2024-07-09 NOTE — PROGRESS NOTES
"Preventive Care Visit  [facility] SUMAN Thrasher PA-C, Family Medicine  Jul 9, 2024      Assessment & Plan     Routine general medical examination at a health care facility  Well adult.     Irregular periods  Eval hemoglobin due to prolonged periods, fatigue, h/o anemia.  - CBC with platelets; Future  - Ferritin; Future    Insomnia, unspecified type  Refilled. Stable.  - hydrOXYzine HCl (ATARAX) 25 MG tablet; Take 1 tablet (25 mg) by mouth every 6 hours as needed for anxiety And up to 50mg at bedtime for insomnia.  - sertraline (ZOLOFT) 100 MG tablet; Take 1 tablet (100 mg) by mouth daily    Anxiety  Refilled.   - sertraline (ZOLOFT) 100 MG tablet; Take 1 tablet (100 mg) by mouth daily    Depression, unspecified depression type  Refilled.   - sertraline (ZOLOFT) 100 MG tablet; Take 1 tablet (100 mg) by mouth daily    Severe persistent asthma, unspecified whether complicated (H28)  Sees pulmonology. On Symbicort. ACT is 20.             BMI  Estimated body mass index is 32.22 kg/m  as calculated from the following:    Height as of this encounter: 1.638 m (5' 4.49\").    Weight as of this encounter: 86.5 kg (190 lb 9.6 oz).       Depression Screening Follow Up        Follow Up Actions Taken  Crisis resource information provided in After Visit Summary     Counseling  Appropriate preventive services were discussed with this patient, including applicable screening as appropriate for fall prevention, nutrition, physical activity, Tobacco-use cessation, weight loss and cognition.  Checklist reviewing preventive services available has been given to the patient.  Reviewed patient's diet, addressing concerns and/or questions.   Nickie is at risk for lack of exercise and has been provided with information to increase physical activity for the benefit of Nickie's well-being.   The patient was instructed to see the dentist every 6 months.   The patient's PHQ-9 score is consistent with moderate depression. " Nickie was provided with information regarding depression.           Subjective   Nickie is a 34 year old, presenting for the following:  Physical and Abnormal Bleeding Problem (Irregular periods )        7/9/2024     7:45 AM   Additional Questions   Roomed by An V.         7/9/2024     7:45 AM   Patient Reported Additional Medications   Patient reports taking the following new medications none        Health Care Directive  Patient does not have a Health Care Directive or Living Will: Discussed advance care planning with patient; information given to patient to review.    HPI    Doing well. No concerns today.     Did have some recent irregular periods - started NuvaRing recently. Wanting to have tubal ligation in the near future. Has 2 kids.     Mood - increase in symptoms but thinks related to work, life stress. Feels it is manageable.       1/9/2024     9:44 AM 6/4/2024     8:02 PM 7/9/2024     7:38 AM   PHQ   PHQ-9 Total Score 12 8 12   Q9: Thoughts of better off dead/self-harm past 2 weeks Not at all Not at all Not at all         11/6/2023     3:00 PM 1/9/2024     9:46 AM 7/9/2024     7:39 AM   SAILAJA-7 SCORE   Total Score 3 (minimal anxiety) 8 (mild anxiety) 13 (moderate anxiety)   Total Score 3 8 13         10/11/2023     8:37 AM 6/4/2024     8:04 PM 7/7/2024    12:42 PM   ACT Total Scores   ACT TOTAL SCORE (Goal Greater than or Equal to 20) 21 18 20   In the past 12 months, how many times did you visit the emergency room for your asthma without being admitted to the hospital? 0 0 0   In the past 12 months, how many times were you hospitalized overnight because of your asthma? 0 0 0                       7/7/2024   General Health   How would you rate your overall physical health? Good   Feel stress (tense, anxious, or unable to sleep) To some extent      (!) STRESS CONCERN      7/7/2024   Nutrition   Three or more servings of calcium each day? Yes   Diet: Regular (no restrictions)   How many servings of fruit and  vegetables per day? (!) 2-3   How many sweetened beverages each day? (!) 2            7/7/2024   Exercise   Days per week of moderate/strenous exercise 3 days   Average minutes spent exercising at this level 60 min            7/7/2024   Social Factors   Frequency of gathering with friends or relatives Once a week   Worry food won't last until get money to buy more No   Food not last or not have enough money for food? No   Do you have housing? (Housing is defined as stable permanent housing and does not include staying ouside in a car, in a tent, in an abandoned building, in an overnight shelter, or couch-surfing.) Yes   Are you worried about losing your housing? No   Lack of transportation? No   Unable to get utilities (heat,electricity)? No            7/7/2024   Dental   Dentist two times every year? (!) NO            7/7/2024   TB Screening   Were you born outside of the [place]? No          Today's PHQ-9 Score:       7/9/2024     7:38 AM   PHQ-9 SCORE   PHQ-9 Total Score MyChart 12 (Moderate depression)   PHQ-9 Total Score 12         7/7/2024   Substance Use   Alcohol more than 3/day or more than 7/wk No   Do you use any other substances recreationally? 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 No        History of abnormal Pap smear: No - age 30- 64 PAP with HPV every 5 years recommended        Latest Ref Rng & Units 8/17/2021     4:16 PM   PAP / HPV   PAP  Negative for Intraepithelial Lesion or Malignancy (NILM)    HPV 16 DNA Negative Negative    HPV 18 DNA Negative Negative  "   Other HR HPV Negative Negative            7/7/2024   Contraception/Family Planning   Questions about contraception or family planning (!) YES            Reviewed and updated as needed this visit by Provider                          Review of Systems  Constitutional, neuro, ENT, endocrine, pulmonary, cardiac, gastrointestinal, genitourinary, musculoskeletal, integument and psychiatric systems are negative, except as otherwise noted.     Objective    Exam  /77   Pulse 72   Temp 98.2  F (36.8  C) (Oral)   Resp 20   Ht 1.638 m (5' 4.49\")   Wt 86.5 kg (190 lb 9.6 oz)   LMP 07/07/2024 (Exact Date)   SpO2 100%   BMI 32.22 kg/m     Estimated body mass index is 32.22 kg/m  as calculated from the following:    Height as of this encounter: 1.638 m (5' 4.49\").    Weight as of this encounter: 86.5 kg (190 lb 9.6 oz).    Physical Exam  GENERAL: alert and no distress  EYES: Eyes grossly normal to inspection, PERRL and conjunctivae and sclerae normal  HENT: ear canals and TM's normal, nose and mouth without ulcers or lesions  NECK: no adenopathy, no asymmetry, masses, or scars  RESP: lungs clear to auscultation - no rales, rhonchi or wheezes  CV: regular rate and rhythm, normal S1 S2, no S3 or S4, no murmur, click or rub, no peripheral edema  ABDOMEN: soft, nontender, no hepatosplenomegaly, no masses and bowel sounds normal  MS: no gross musculoskeletal defects noted, no edema  SKIN: no suspicious lesions or rashes  NEURO: Normal strength and tone, mentation intact and speech normal  PSYCH: mentation appears normal, affect normal/bright        Signed Electronically by: Noris Thrasher PA-C    Answers submitted by the patient for this visit:  Patient Health Questionnaire (Submitted on 7/9/2024)  If you checked off any problems, how difficult have these problems made it for you to do your work, take care of things at home, or get along with other people?: Somewhat difficult  PHQ9 TOTAL SCORE: 12  SAILAJA-7 (Submitted on " 7/9/2024)  SAILAJA 7 TOTAL SCORE: 13

## 2024-07-10 LAB — FERRITIN SERPL-MCNC: 6 NG/ML (ref 6–409)

## 2024-08-02 ENCOUNTER — MYC REFILL (OUTPATIENT)
Dept: FAMILY MEDICINE | Facility: CLINIC | Age: 34
End: 2024-08-02
Payer: COMMERCIAL

## 2024-08-02 DIAGNOSIS — Z77.29 EXPOSURE TO SMOKE FROM INDUSTRIAL SOURCE: ICD-10-CM

## 2024-08-02 DIAGNOSIS — R06.02 SHORTNESS OF BREATH ON EXERTION: ICD-10-CM

## 2024-08-02 RX ORDER — ALBUTEROL SULFATE 90 UG/1
1-2 AEROSOL, METERED RESPIRATORY (INHALATION) EVERY 6 HOURS PRN
Qty: 18 G | Refills: 0 | Status: SHIPPED | OUTPATIENT
Start: 2024-08-02

## 2024-08-12 DIAGNOSIS — G47.00 INSOMNIA, UNSPECIFIED TYPE: ICD-10-CM

## 2024-08-13 RX ORDER — HYDROXYZINE HYDROCHLORIDE 25 MG/1
TABLET, FILM COATED ORAL
Qty: 540 TABLET | Refills: 1 | Status: SHIPPED | OUTPATIENT
Start: 2024-08-13

## 2024-09-01 DIAGNOSIS — J45.50 SEVERE PERSISTENT ASTHMA, UNSPECIFIED WHETHER COMPLICATED (H): ICD-10-CM

## 2024-09-04 RX ORDER — BUDESONIDE AND FORMOTEROL FUMARATE DIHYDRATE 160; 4.5 UG/1; UG/1
2 AEROSOL RESPIRATORY (INHALATION) 2 TIMES DAILY
Qty: 1 G | Refills: 4 | Status: SHIPPED | OUTPATIENT
Start: 2024-09-04 | End: 2024-10-04

## 2024-09-04 NOTE — TELEPHONE ENCOUNTER
Requested Prescriptions   Pending Prescriptions Disp Refills    SYMBICORT 160-4.5 MCG/ACT Inhaler [Pharmacy Med Name: SYMBICORT 160-4.5 MCG INHALER]  4     Sig: INHALE 2 PUFFS INTO THE LUNGS 2 TIMES DAILY FOR 30 DAYS.       Inhaled Steroids Protocol Failed - 9/1/2024  7:30 AM        Failed - Recent (6 mo) or future (90 days) visit within the authorizing provider's specialty     The patient must have completed an in-person or virtual visit within the past 6 months or has a future visit scheduled within the next 90 days with the authorizing provider s specialty.  Urgent care and e-visits do not quality as an office visit for this protocol.          Passed - Patient is age 12 or older        Passed - Asthma control assessment score within normal limits in last 6 months     Please review ACT score.           Passed - Medication is active on med list        Passed - Medication indicated for associated diagnosis     Medication is associated with one or more of the following diagnoses:    Allergies   Asthma   COPD   Nasal Congestion   Nasal Polyps   Rhinitis             Routing refill request to provider for review/approval because:  Patient needs to be seen because it has been more than 1 year since last office visit. LOV 8/7/2023 - RTC in 3 months    ALFRED RankinN, RN, PHN 9/4/2024 10:02 AM

## 2025-06-09 ENCOUNTER — PATIENT OUTREACH (OUTPATIENT)
Dept: CARE COORDINATION | Facility: CLINIC | Age: 35
End: 2025-06-09
Payer: COMMERCIAL

## 2025-06-23 ENCOUNTER — PATIENT OUTREACH (OUTPATIENT)
Dept: CARE COORDINATION | Facility: CLINIC | Age: 35
End: 2025-06-23
Payer: COMMERCIAL

## 2025-08-17 ENCOUNTER — HEALTH MAINTENANCE LETTER (OUTPATIENT)
Age: 35
End: 2025-08-17